# Patient Record
Sex: MALE | Race: WHITE | NOT HISPANIC OR LATINO | Employment: OTHER | URBAN - METROPOLITAN AREA
[De-identification: names, ages, dates, MRNs, and addresses within clinical notes are randomized per-mention and may not be internally consistent; named-entity substitution may affect disease eponyms.]

---

## 2022-10-14 ENCOUNTER — TELEPHONE (OUTPATIENT)
Dept: NEUROLOGY | Facility: CLINIC | Age: 72
End: 2022-10-14

## 2023-03-09 ENCOUNTER — OFFICE VISIT (OUTPATIENT)
Dept: NEUROSURGERY | Facility: CLINIC | Age: 73
End: 2023-03-09
Payer: MEDICARE

## 2023-03-09 VITALS — HEART RATE: 55 BPM | HEIGHT: 65 IN | DIASTOLIC BLOOD PRESSURE: 66 MMHG | SYSTOLIC BLOOD PRESSURE: 122 MMHG

## 2023-03-09 DIAGNOSIS — G99.2 MYELOPATHY CONCURRENT WITH AND DUE TO SPINAL STENOSIS OF CERVICAL REGION: ICD-10-CM

## 2023-03-09 DIAGNOSIS — G95.89 CERVICAL CORD MYELOMALACIA: Primary | ICD-10-CM

## 2023-03-09 DIAGNOSIS — M48.02 MYELOPATHY CONCURRENT WITH AND DUE TO SPINAL STENOSIS OF CERVICAL REGION: ICD-10-CM

## 2023-03-09 PROCEDURE — 1160F RVW MEDS BY RX/DR IN RCRD: CPT | Performed by: NEUROLOGICAL SURGERY

## 2023-03-09 PROCEDURE — 1159F MED LIST DOCD IN RCRD: CPT | Performed by: NEUROLOGICAL SURGERY

## 2023-03-09 PROCEDURE — 99204 OFFICE O/P NEW MOD 45 MIN: CPT | Performed by: NEUROLOGICAL SURGERY

## 2023-03-09 RX ORDER — IPRATROPIUM BROMIDE AND ALBUTEROL SULFATE 2.5; .5 MG/3ML; MG/3ML
SOLUTION RESPIRATORY (INHALATION)
COMMUNITY
Start: 2023-02-18

## 2023-03-09 RX ORDER — ATORVASTATIN CALCIUM 40 MG/1
TABLET, FILM COATED ORAL
COMMUNITY

## 2023-03-09 RX ORDER — CLOPIDOGREL BISULFATE 75 MG/1
TABLET ORAL
COMMUNITY
Start: 2023-02-06

## 2023-03-09 RX ORDER — GLYBURIDE 2.5 MG/1
TABLET ORAL
COMMUNITY
Start: 2023-02-06

## 2023-03-09 RX ORDER — CARVEDILOL 6.25 MG/1
TABLET ORAL
COMMUNITY
Start: 2023-02-06

## 2023-03-09 RX ORDER — OMEPRAZOLE 20 MG/1
CAPSULE, DELAYED RELEASE ORAL
COMMUNITY
Start: 2023-02-06

## 2023-03-09 RX ORDER — PYRIDOXINE HCL (VITAMIN B6) 100 MG
TABLET ORAL
COMMUNITY

## 2023-03-09 RX ORDER — FOLIC ACID 1 MG/1
TABLET ORAL
COMMUNITY

## 2023-03-09 RX ORDER — SACUBITRIL AND VALSARTAN 24; 26 MG/1; MG/1
TABLET, FILM COATED ORAL
COMMUNITY
Start: 2023-02-18

## 2023-03-09 RX ORDER — ESCITALOPRAM OXALATE 10 MG/1
TABLET ORAL
COMMUNITY
Start: 2023-02-06

## 2023-03-09 RX ORDER — ASPIRIN 81 MG/1
TABLET ORAL
COMMUNITY
Start: 2023-02-18

## 2023-03-09 NOTE — PROGRESS NOTES
"Chief Complaint  Neck Pain    Subjective          Felix Cortez who is a 72 y.o. year old male who presents to [facility] for Evaluation of the Spine.     The patient complains of pain located in the cervical spine.  Patients states the pain has been present for 1 year.  The pain came on gradually in a wheelchair for about 3 weeks now. The pain scale level is 5-10/10.  The pain radiates into the bilateral arms with numbness, swelling and discoloration.  The pain is waxing/waning and described as burning.  The pain is worse at no particular time of day. Patient states laying down and turning left makes the pain worse.  Patient states changing positions makes the pain better. For about a month or more, his legs have had \"a mind of their own\" with jerking and \"earthquaking.\"    Associated Symptoms Include: Numbness and tingling into the arms and legs, present all the time  Conservative Interventions Include: PT-some help    Was this the result of an injury or accident?: Yes, Fall in the hospital    History of Previous Spinal Surgery?: No    This patient  reports that he has been smoking cigarettes. He has never used smokeless tobacco.    Review of Systems   Musculoskeletal: Positive for arthralgias, back pain, gait problem, myalgias and neck pain.   Neurological: Positive for weakness and numbness.        Objective   Vital Signs:   /66 (BP Location: Left arm, Patient Position: Sitting)   Pulse 55   Ht 165.1 cm (65\")       Physical Exam  Constitutional:       Comments: In wheelchair   Cardiovascular:      Comments: No significant LE edema  Pulmonary:      Effort: Pulmonary effort is normal.   Neurological:      Mental Status: He is alert.      Sensory: Sensory deficit (decreased to light touch in the bilateral upper and lower extremities) present.      Motor: No weakness.      Deep Tendon Reflexes: Reflexes abnormal.      Comments: Clearly myelopathy with " hyperactive reflexes and clonus in the BLE and Hoffmans in the BUE   Psychiatric:         Mood and Affect: Mood normal.          Result Review :   I personally reviewed the patient's MRI scan which shows multilevel degenerative changes with spinal stenosis and signal change within the spinal cord at C3-4.       Assessment and Plan    Diagnoses and all orders for this visit:    1. Cervical cord myelomalacia (HCC) (Primary)    2. Myelopathy concurrent with and due to spinal stenosis of cervical region (HCC)    I feel his best chance to prevent further decline and potentially improve is decompression at C3-4.    He will need permission to hold his ASA and Plavix for a week.    He is at increased risk of complication with his medical issues.    We discussed the importance of smoking/nicotine cessation. Smoking/nicotine use has multiple health risks. In particular related to the spine, nicotine increases the incidence of lower back pain, speeds up the progression of degenerative disc disease and dramatically reduces healing after spine surgery (particularly a fusion operation).     Follow Up   No follow-ups on file.  Patient was given instructions and counseling regarding his condition or for health maintenance advice. Please see specific information pulled into the AVS if appropriate.

## 2023-03-10 ENCOUNTER — TELEPHONE (OUTPATIENT)
Dept: NEUROSURGERY | Facility: CLINIC | Age: 73
End: 2023-03-10
Payer: MEDICARE

## 2023-03-10 NOTE — TELEPHONE ENCOUNTER
Kierra with [facility] called to get copy of office note faxed and follow up on scheduling of surgery. I advised that patient needs cardiac clearance before we can schedule, and patient is unaware who is cardiologist is. Kierra states they will get patient an appointment and have them send us something for clearance. Office visit faxed.

## 2023-03-10 NOTE — TELEPHONE ENCOUNTER
EAMON FROM FACILITY CALLED TO STATE THE PATIENT HAS A CARDIOLOGY APPT ON 3/28.  EAMON WANTED TO KNOW IF SX CAN GET SCHEDULED SO THEY CAN MAKE PLANS, OR IF SCHEDULING THE SX WOULD NEED TO WAIT UNTIL AFTER CARDIOLOGY APPT.    PLEASE CALL TO ADVISE  [phone]

## 2023-03-22 PROBLEM — G99.2 MYELOPATHY CONCURRENT WITH AND DUE TO SPINAL STENOSIS OF CERVICAL REGION: Status: ACTIVE | Noted: 2023-03-22

## 2023-03-22 PROBLEM — G95.89 CERVICAL CORD MYELOMALACIA: Status: ACTIVE | Noted: 2023-03-22

## 2023-03-22 PROBLEM — M48.02 MYELOPATHY CONCURRENT WITH AND DUE TO SPINAL STENOSIS OF CERVICAL REGION: Status: ACTIVE | Noted: 2023-03-22

## 2023-03-22 NOTE — TELEPHONE ENCOUNTER
Samuel called to see we have received medical clearance. If so he would like to schedule Felix's surgery.    Felix request a call back at [phone]

## 2023-04-11 ENCOUNTER — TELEPHONE (OUTPATIENT)
Dept: NEUROSURGERY | Facility: CLINIC | Age: 73
End: 2023-04-11
Payer: MEDICARE

## 2023-04-11 NOTE — TELEPHONE ENCOUNTER
Kate with [facility] financial clearance sent email to ask product information for allograft, cage, and spinal instrumentation. I know that you use [employer], but I believe they may have requested wrong CPT codes, will you be using a cage? Here are the codes submitted: 33885, 81448, 80638, 41678, 71531.  Patient having C3-4 ACDF.   I don't think they need the 86266? And maybe the 68043 should be 32141?   Surgery 4-19-23.

## 2023-04-14 ENCOUNTER — TELEPHONE (OUTPATIENT)
Dept: NEUROLOGY | Facility: CLINIC | Age: 73
End: 2023-04-14
Payer: MEDICARE

## 2023-04-14 RX ORDER — FUROSEMIDE 20 MG/1
20 TABLET ORAL 2 TIMES DAILY
COMMUNITY

## 2023-04-14 RX ORDER — LIDOCAINE 4 G/G
PATCH TOPICAL
COMMUNITY

## 2023-04-14 RX ORDER — TRAMADOL HYDROCHLORIDE 50 MG/1
50 TABLET ORAL EVERY 8 HOURS PRN
COMMUNITY
Start: 2023-04-06 | End: 2023-04-21 | Stop reason: HOSPADM

## 2023-04-14 RX ORDER — GABAPENTIN 100 MG/1
100 CAPSULE ORAL NIGHTLY
COMMUNITY
Start: 2023-04-07

## 2023-04-14 RX ORDER — ACETAMINOPHEN 325 MG/1
650 TABLET ORAL EVERY 6 HOURS PRN
COMMUNITY

## 2023-04-14 RX ORDER — CHOLECALCIFEROL (VITAMIN D3) 125 MCG
5 CAPSULE ORAL NIGHTLY PRN
COMMUNITY

## 2023-04-14 NOTE — PRE-PROCEDURE INSTRUCTIONS
IMPORTANT INSTRUCTIONS - PRE-ADMISSION TESTING  1. DO NOT EAT OR CHEW anything after midnight the night before your procedure.    2. You may have CLEAR liquids up to ______ hours prior to ARRIVAL time.   3. Take the following medications the morning of your procedure with JUST A SIP OF WATER:  _CARVEDILOL, OMEPRAZOLE, ESCITALOPRAM. PRN: DUO NEB, TRAMADOL, TYLENOL________________________________________________________________________________________________________________________________________________________*_LAST DOSE OF CLOPIDOGREL, ASPIRIN, VOLTARIN,FOLIC ACID, MELATONIN TO BE 4/13/23_____________________________    4. DO NOT BRING your medications to the hospital with you, UNLESS something has changed since your PRE-Admission Testing appointment.  5. Hold all vitamins, supplements, and NSAIDS (Non- steroidal anti-inflammatory meds) for one week prior to surgery (you MAY take Tylenol or Acetaminophen).  6. If you are diabetic, check your blood sugar the morning of your procedure. If it is less than 70 or if you are feeling symptomatic, call the following number for further instructions: _[phone] _SAME DAY SURGERY WILL CALL ARRIVAL TIME_____.  7. Use your inhalers/nebulizers as usual, the morning of your procedure. BRING YOUR INHALERS with you.   8. Bring your CPAP or BIPAP to hospital, ONLY IF YOU WILL BE SPENDING THE NIGHT. NA  9. Make sure you have a ride home and have someone who will stay with you the day of your procedure after you go home.  10. If you have any questions, please call your Pre-Admission Testing Nurse, __EMANI CONNOR RN______________ at [phone]_.   11. Per anesthesia request, do not smoke for 24 hours before your procedure or as instructed by your surgeon. !!!!!   PREOPERATIVE (BEFORE SURGERY)              BATHING INSTRUCTIONS  Instructions:   • You will need to shower 1 time utilizing the soap provided; at the times indicated   below:     - 4/18/23  PM OR 4/19 AM    •  Wash your hair and face with normal shampoo and soap, rinse it well before using the surgical soap.     • In the shower, wet the skin completely with water from your neck to your feet. Apply the cleanser to your   body ONLY FROM THE NECK TO YOUR FEET.    • Do NOT USE THE CLEANSER ON YOUR FACE, HEAD, OR GENITAL (PRIVATE) AREAS.   Keep it out of your eyes, ears, and mouth because of the risk of injury to those areas.     • Scrub with a clean washcloth for each bath utilizing the soap provided from the top of your body to the   bottom starting at the neck area.     • Pay close attention to your armpits, groin area, and the site of surgery.     • Wash your body gently for 5 minutes. Stand outside the stream or turn off the water while scrubbing your   body. Do NOT wash with your regular soap after the surgical cleanser is used.     • RINSE THE CLEANSER OFF COMPLETELY with plenty of water. Rinse the area again thoroughly.     • Dry off with a clean towel. The surgical soap can cause dryness; however do NOT APPLY LOTION,   CREAM, POWDER, and/or DEODORANT AFTER SHOWERING.    • Be sure to where clean clothes after showering.     • Ensure CLEAN BED LINENS AFTER FIRST wash with the surgical soap.     • NO PETS ALLOWED IN THE BED with you after utilizing the surgical soap.

## 2023-04-14 NOTE — TELEPHONE ENCOUNTER
VIJAY, RECEIVED A CALL FROM PRE ADMISSION TESTING CALLING TO NOTIFY US THAT AFTER CALLING AROUND TO LOCATE PATIENT, SHE FOUND OUT PATIENT IS INPATIENT AT [facility]

## 2023-04-14 NOTE — NURSING NOTE
PT IS ON PAT CALL SCHEDULE AND FOUND TO BE IN PT. IN [facility].  FACILITY CALLED HISTORY AND PT INFORMATION REQUESTED. AWAITING FAX.

## 2023-04-14 NOTE — NURSING NOTE
PREOP INST RE: FASTING, BATHING, MED ADMINISTRATION FAXED TO [facility].. CALLED AND LM FOR PRIMARY RN ANJALI TO EXPECT FAX.

## 2023-04-17 ENCOUNTER — ANESTHESIA EVENT (OUTPATIENT)
Dept: PERIOP | Facility: HOSPITAL | Age: 73
End: 2023-04-17
Payer: MEDICARE

## 2023-04-19 ENCOUNTER — APPOINTMENT (OUTPATIENT)
Dept: GENERAL RADIOLOGY | Facility: HOSPITAL | Age: 73
End: 2023-04-19
Payer: MEDICARE

## 2023-04-19 ENCOUNTER — ANESTHESIA (OUTPATIENT)
Dept: PERIOP | Facility: HOSPITAL | Age: 73
End: 2023-04-19
Payer: MEDICARE

## 2023-04-19 ENCOUNTER — HOSPITAL ENCOUNTER (OUTPATIENT)
Facility: HOSPITAL | Age: 73
Discharge: NURSING, REHAB OR LONG-TERM CARE | End: 2023-04-21
Attending: NEUROLOGICAL SURGERY | Admitting: NEUROLOGICAL SURGERY
Payer: MEDICARE

## 2023-04-19 DIAGNOSIS — G95.89 CERVICAL CORD MYELOMALACIA: ICD-10-CM

## 2023-04-19 DIAGNOSIS — R26.2 DIFFICULTY IN WALKING: Primary | ICD-10-CM

## 2023-04-19 DIAGNOSIS — M48.02 MYELOPATHY CONCURRENT WITH AND DUE TO SPINAL STENOSIS OF CERVICAL REGION: ICD-10-CM

## 2023-04-19 DIAGNOSIS — G99.2 MYELOPATHY CONCURRENT WITH AND DUE TO SPINAL STENOSIS OF CERVICAL REGION: ICD-10-CM

## 2023-04-19 DIAGNOSIS — Z78.9 DECREASED ACTIVITIES OF DAILY LIVING (ADL): ICD-10-CM

## 2023-04-19 LAB
ANION GAP SERPL CALCULATED.3IONS-SCNC: 10.8 MMOL/L (ref 5–15)
BUN SERPL-MCNC: 16 MG/DL (ref 8–23)
BUN/CREAT SERPL: 22.9 (ref 7–25)
CALCIUM SPEC-SCNC: 9.6 MG/DL (ref 8.6–10.5)
CHLORIDE SERPL-SCNC: 108 MMOL/L (ref 98–107)
CO2 SERPL-SCNC: 27.2 MMOL/L (ref 22–29)
CREAT SERPL-MCNC: 0.7 MG/DL (ref 0.76–1.27)
EGFRCR SERPLBLD CKD-EPI 2021: 97.9 ML/MIN/1.73
GLUCOSE BLDC GLUCOMTR-MCNC: 117 MG/DL (ref 70–99)
GLUCOSE SERPL-MCNC: 110 MG/DL (ref 65–99)
POTASSIUM SERPL-SCNC: 3.6 MMOL/L (ref 3.5–5.2)
SODIUM SERPL-SCNC: 146 MMOL/L (ref 136–145)

## 2023-04-19 PROCEDURE — 63710000001 VITAMIN D3 125 MCG (5000 UT) CAPSULE: Performed by: NEUROLOGICAL SURGERY

## 2023-04-19 PROCEDURE — 25010000002 ONDANSETRON PER 1 MG: Performed by: NURSE ANESTHETIST, CERTIFIED REGISTERED

## 2023-04-19 PROCEDURE — 82962 GLUCOSE BLOOD TEST: CPT

## 2023-04-19 PROCEDURE — 25010000002 FENTANYL CITRATE (PF) 50 MCG/ML SOLUTION: Performed by: NURSE ANESTHETIST, CERTIFIED REGISTERED

## 2023-04-19 PROCEDURE — A9270 NON-COVERED ITEM OR SERVICE: HCPCS | Performed by: NEUROLOGICAL SURGERY

## 2023-04-19 PROCEDURE — 63710000001 MELATONIN 5 MG TABLET: Performed by: NEUROLOGICAL SURGERY

## 2023-04-19 PROCEDURE — 63710000001 OXYCODONE 5 MG TABLET: Performed by: NURSE ANESTHETIST, CERTIFIED REGISTERED

## 2023-04-19 PROCEDURE — C1713 ANCHOR/SCREW BN/BN,TIS/BN: HCPCS | Performed by: NEUROLOGICAL SURGERY

## 2023-04-19 PROCEDURE — 22551 ARTHRD ANT NTRBDY CERVICAL: CPT | Performed by: SPECIALIST/TECHNOLOGIST, OTHER

## 2023-04-19 PROCEDURE — 25010000002 HYDROMORPHONE 1 MG/ML SOLUTION: Performed by: NURSE ANESTHETIST, CERTIFIED REGISTERED

## 2023-04-19 PROCEDURE — S0260 H&P FOR SURGERY: HCPCS | Performed by: NEUROLOGICAL SURGERY

## 2023-04-19 PROCEDURE — 25010000002 CEFAZOLIN IN DEXTROSE 2-4 GM/100ML-% SOLUTION: Performed by: NEUROLOGICAL SURGERY

## 2023-04-19 PROCEDURE — 80048 BASIC METABOLIC PNL TOTAL CA: CPT | Performed by: NEUROLOGICAL SURGERY

## 2023-04-19 PROCEDURE — 20931 SP BONE ALGRFT STRUCT ADD-ON: CPT | Performed by: NEUROLOGICAL SURGERY

## 2023-04-19 PROCEDURE — 63710000001 ATORVASTATIN 40 MG TABLET: Performed by: NEUROLOGICAL SURGERY

## 2023-04-19 PROCEDURE — 63710000001 FOLIC ACID 1 MG TABLET: Performed by: NEUROLOGICAL SURGERY

## 2023-04-19 PROCEDURE — 63710000001 GABAPENTIN 100 MG CAPSULE: Performed by: NEUROLOGICAL SURGERY

## 2023-04-19 PROCEDURE — 22551 ARTHRD ANT NTRBDY CERVICAL: CPT | Performed by: NEUROLOGICAL SURGERY

## 2023-04-19 PROCEDURE — 22845 INSERT SPINE FIXATION DEVICE: CPT | Performed by: SPECIALIST/TECHNOLOGIST, OTHER

## 2023-04-19 PROCEDURE — 25010000002 PROPOFOL 10 MG/ML EMULSION: Performed by: NURSE ANESTHETIST, CERTIFIED REGISTERED

## 2023-04-19 PROCEDURE — A9270 NON-COVERED ITEM OR SERVICE: HCPCS | Performed by: NURSE ANESTHETIST, CERTIFIED REGISTERED

## 2023-04-19 PROCEDURE — 22845 INSERT SPINE FIXATION DEVICE: CPT | Performed by: NEUROLOGICAL SURGERY

## 2023-04-19 PROCEDURE — 63710000001 FUROSEMIDE 20 MG TABLET: Performed by: NEUROLOGICAL SURGERY

## 2023-04-19 PROCEDURE — 94799 UNLISTED PULMONARY SVC/PX: CPT

## 2023-04-19 PROCEDURE — 25010000002 DEXAMETHASONE PER 1 MG: Performed by: NURSE ANESTHETIST, CERTIFIED REGISTERED

## 2023-04-19 PROCEDURE — 63710000001 CARVEDILOL 6.25 MG TABLET: Performed by: NEUROLOGICAL SURGERY

## 2023-04-19 PROCEDURE — 76000 FLUOROSCOPY <1 HR PHYS/QHP: CPT

## 2023-04-19 PROCEDURE — 25010000002 MIDAZOLAM PER 1MG: Performed by: ANESTHESIOLOGY

## 2023-04-19 PROCEDURE — 63710000001 SACUBITRIL-VALSARTAN 24-26 MG TABLET: Performed by: NEUROLOGICAL SURGERY

## 2023-04-19 DEVICE — ALLOGRFT SPINE CERV VERTIGRAFT WEDGE FZ 7DEG PRESERV 4.75MM: Type: IMPLANTABLE DEVICE | Site: SPINE CERVICAL | Status: FUNCTIONAL

## 2023-04-19 DEVICE — SCRW SKYLINE VAR SD 14MM: Type: IMPLANTABLE DEVICE | Site: SPINE CERVICAL | Status: FUNCTIONAL

## 2023-04-19 DEVICE — PLT SKYLINE 1LEVEL 14MM: Type: IMPLANTABLE DEVICE | Site: SPINE CERVICAL | Status: FUNCTIONAL

## 2023-04-19 RX ORDER — PROPOFOL 10 MG/ML
VIAL (ML) INTRAVENOUS AS NEEDED
Status: DISCONTINUED | OUTPATIENT
Start: 2023-04-19 | End: 2023-04-19 | Stop reason: SURG

## 2023-04-19 RX ORDER — MIDAZOLAM HYDROCHLORIDE 2 MG/2ML
1 INJECTION, SOLUTION INTRAMUSCULAR; INTRAVENOUS ONCE
Status: COMPLETED | OUTPATIENT
Start: 2023-04-19 | End: 2023-04-19

## 2023-04-19 RX ORDER — ACETAMINOPHEN 500 MG
1000 TABLET ORAL ONCE
Status: COMPLETED | OUTPATIENT
Start: 2023-04-19 | End: 2023-04-19

## 2023-04-19 RX ORDER — LIDOCAINE HYDROCHLORIDE 20 MG/ML
INJECTION, SOLUTION EPIDURAL; INFILTRATION; INTRACAUDAL; PERINEURAL AS NEEDED
Status: DISCONTINUED | OUTPATIENT
Start: 2023-04-19 | End: 2023-04-19 | Stop reason: SURG

## 2023-04-19 RX ORDER — ONDANSETRON 2 MG/ML
INJECTION INTRAMUSCULAR; INTRAVENOUS AS NEEDED
Status: DISCONTINUED | OUTPATIENT
Start: 2023-04-19 | End: 2023-04-19 | Stop reason: SURG

## 2023-04-19 RX ORDER — ONDANSETRON 2 MG/ML
4 INJECTION INTRAMUSCULAR; INTRAVENOUS ONCE AS NEEDED
Status: DISCONTINUED | OUTPATIENT
Start: 2023-04-19 | End: 2023-04-19 | Stop reason: HOSPADM

## 2023-04-19 RX ORDER — OXYCODONE AND ACETAMINOPHEN 10; 325 MG/1; MG/1
1 TABLET ORAL EVERY 4 HOURS PRN
Status: DISCONTINUED | OUTPATIENT
Start: 2023-04-19 | End: 2023-04-21 | Stop reason: HOSPADM

## 2023-04-19 RX ORDER — DEXAMETHASONE SODIUM PHOSPHATE 4 MG/ML
INJECTION, SOLUTION INTRA-ARTICULAR; INTRALESIONAL; INTRAMUSCULAR; INTRAVENOUS; SOFT TISSUE AS NEEDED
Status: DISCONTINUED | OUTPATIENT
Start: 2023-04-19 | End: 2023-04-19 | Stop reason: SURG

## 2023-04-19 RX ORDER — MORPHINE SULFATE 2 MG/ML
2 INJECTION, SOLUTION INTRAMUSCULAR; INTRAVENOUS EVERY 4 HOURS PRN
Status: DISCONTINUED | OUTPATIENT
Start: 2023-04-19 | End: 2023-04-21 | Stop reason: HOSPADM

## 2023-04-19 RX ORDER — MAGNESIUM HYDROXIDE 1200 MG/15ML
LIQUID ORAL AS NEEDED
Status: DISCONTINUED | OUTPATIENT
Start: 2023-04-19 | End: 2023-04-19 | Stop reason: HOSPADM

## 2023-04-19 RX ORDER — PROMETHAZINE HYDROCHLORIDE 12.5 MG/1
25 TABLET ORAL ONCE AS NEEDED
Status: DISCONTINUED | OUTPATIENT
Start: 2023-04-19 | End: 2023-04-19 | Stop reason: HOSPADM

## 2023-04-19 RX ORDER — SODIUM CHLORIDE, SODIUM LACTATE, POTASSIUM CHLORIDE, CALCIUM CHLORIDE 600; 310; 30; 20 MG/100ML; MG/100ML; MG/100ML; MG/100ML
9 INJECTION, SOLUTION INTRAVENOUS CONTINUOUS PRN
Status: DISCONTINUED | OUTPATIENT
Start: 2023-04-19 | End: 2023-04-19 | Stop reason: HOSPADM

## 2023-04-19 RX ORDER — CEFAZOLIN SODIUM 2 G/100ML
2 INJECTION, SOLUTION INTRAVENOUS ONCE
Status: COMPLETED | OUTPATIENT
Start: 2023-04-19 | End: 2023-04-19

## 2023-04-19 RX ORDER — ONDANSETRON 2 MG/ML
4 INJECTION INTRAMUSCULAR; INTRAVENOUS EVERY 6 HOURS PRN
Status: DISCONTINUED | OUTPATIENT
Start: 2023-04-19 | End: 2023-04-21 | Stop reason: HOSPADM

## 2023-04-19 RX ORDER — DEXMEDETOMIDINE HYDROCHLORIDE 100 UG/ML
INJECTION, SOLUTION INTRAVENOUS AS NEEDED
Status: DISCONTINUED | OUTPATIENT
Start: 2023-04-19 | End: 2023-04-19 | Stop reason: SURG

## 2023-04-19 RX ORDER — GABAPENTIN 100 MG/1
100 CAPSULE ORAL NIGHTLY
Status: DISCONTINUED | OUTPATIENT
Start: 2023-04-19 | End: 2023-04-21 | Stop reason: HOSPADM

## 2023-04-19 RX ORDER — FENTANYL CITRATE 50 UG/ML
INJECTION, SOLUTION INTRAMUSCULAR; INTRAVENOUS AS NEEDED
Status: DISCONTINUED | OUTPATIENT
Start: 2023-04-19 | End: 2023-04-19 | Stop reason: SURG

## 2023-04-19 RX ORDER — GLIPIZIDE 5 MG/1
5 TABLET ORAL
Status: DISCONTINUED | OUTPATIENT
Start: 2023-04-20 | End: 2023-04-21 | Stop reason: HOSPADM

## 2023-04-19 RX ORDER — ESCITALOPRAM OXALATE 10 MG/1
10 TABLET ORAL EVERY MORNING
Status: DISCONTINUED | OUTPATIENT
Start: 2023-04-20 | End: 2023-04-21 | Stop reason: HOSPADM

## 2023-04-19 RX ORDER — CHOLECALCIFEROL (VITAMIN D3) 125 MCG
5 CAPSULE ORAL NIGHTLY
Status: DISCONTINUED | OUTPATIENT
Start: 2023-04-19 | End: 2023-04-21 | Stop reason: HOSPADM

## 2023-04-19 RX ORDER — PANTOPRAZOLE SODIUM 40 MG/1
40 TABLET, DELAYED RELEASE ORAL
Status: DISCONTINUED | OUTPATIENT
Start: 2023-04-20 | End: 2023-04-21 | Stop reason: HOSPADM

## 2023-04-19 RX ORDER — SODIUM CHLORIDE 9 MG/ML
50 INJECTION, SOLUTION INTRAVENOUS CONTINUOUS
Status: DISCONTINUED | OUTPATIENT
Start: 2023-04-19 | End: 2023-04-21 | Stop reason: HOSPADM

## 2023-04-19 RX ORDER — KETAMINE HCL IN NACL, ISO-OSM 100MG/10ML
SYRINGE (ML) INJECTION AS NEEDED
Status: DISCONTINUED | OUTPATIENT
Start: 2023-04-19 | End: 2023-04-19 | Stop reason: SURG

## 2023-04-19 RX ORDER — ROCURONIUM BROMIDE 10 MG/ML
INJECTION, SOLUTION INTRAVENOUS AS NEEDED
Status: DISCONTINUED | OUTPATIENT
Start: 2023-04-19 | End: 2023-04-19 | Stop reason: SURG

## 2023-04-19 RX ORDER — ATORVASTATIN CALCIUM 40 MG/1
40 TABLET, FILM COATED ORAL NIGHTLY
Status: DISCONTINUED | OUTPATIENT
Start: 2023-04-19 | End: 2023-04-21 | Stop reason: HOSPADM

## 2023-04-19 RX ORDER — PROMETHAZINE HYDROCHLORIDE 25 MG/1
25 SUPPOSITORY RECTAL ONCE AS NEEDED
Status: DISCONTINUED | OUTPATIENT
Start: 2023-04-19 | End: 2023-04-19 | Stop reason: HOSPADM

## 2023-04-19 RX ORDER — OXYCODONE HYDROCHLORIDE 5 MG/1
5 TABLET ORAL
Status: DISCONTINUED | OUTPATIENT
Start: 2023-04-19 | End: 2023-04-19 | Stop reason: HOSPADM

## 2023-04-19 RX ORDER — CEFAZOLIN SODIUM 2 G/100ML
2 INJECTION, SOLUTION INTRAVENOUS EVERY 8 HOURS
Status: COMPLETED | OUTPATIENT
Start: 2023-04-19 | End: 2023-04-20

## 2023-04-19 RX ORDER — IPRATROPIUM BROMIDE AND ALBUTEROL SULFATE 2.5; .5 MG/3ML; MG/3ML
3 SOLUTION RESPIRATORY (INHALATION) EVERY 4 HOURS PRN
Status: DISCONTINUED | OUTPATIENT
Start: 2023-04-19 | End: 2023-04-21 | Stop reason: HOSPADM

## 2023-04-19 RX ORDER — FUROSEMIDE 20 MG/1
20 TABLET ORAL 2 TIMES DAILY
Status: DISCONTINUED | OUTPATIENT
Start: 2023-04-19 | End: 2023-04-21 | Stop reason: HOSPADM

## 2023-04-19 RX ORDER — FOLIC ACID 1 MG/1
1 TABLET ORAL DAILY
Status: DISCONTINUED | OUTPATIENT
Start: 2023-04-19 | End: 2023-04-21 | Stop reason: HOSPADM

## 2023-04-19 RX ORDER — NALOXONE HCL 0.4 MG/ML
0.4 VIAL (ML) INJECTION
Status: DISCONTINUED | OUTPATIENT
Start: 2023-04-19 | End: 2023-04-21 | Stop reason: HOSPADM

## 2023-04-19 RX ORDER — EPHEDRINE SULFATE 50 MG/ML
INJECTION, SOLUTION INTRAVENOUS AS NEEDED
Status: DISCONTINUED | OUTPATIENT
Start: 2023-04-19 | End: 2023-04-19 | Stop reason: SURG

## 2023-04-19 RX ORDER — BUPIVACAINE HYDROCHLORIDE AND EPINEPHRINE 5; 5 MG/ML; UG/ML
INJECTION, SOLUTION EPIDURAL; INTRACAUDAL; PERINEURAL AS NEEDED
Status: DISCONTINUED | OUTPATIENT
Start: 2023-04-19 | End: 2023-04-19 | Stop reason: HOSPADM

## 2023-04-19 RX ORDER — OXYCODONE HYDROCHLORIDE AND ACETAMINOPHEN 5; 325 MG/1; MG/1
1 TABLET ORAL EVERY 4 HOURS PRN
Status: DISCONTINUED | OUTPATIENT
Start: 2023-04-19 | End: 2023-04-21 | Stop reason: HOSPADM

## 2023-04-19 RX ORDER — CARVEDILOL 6.25 MG/1
6.25 TABLET ORAL 2 TIMES DAILY WITH MEALS
Status: DISCONTINUED | OUTPATIENT
Start: 2023-04-19 | End: 2023-04-21 | Stop reason: HOSPADM

## 2023-04-19 RX ORDER — CARVEDILOL 6.25 MG/1
6.25 TABLET ORAL ONCE
Status: COMPLETED | OUTPATIENT
Start: 2023-04-19 | End: 2023-04-19

## 2023-04-19 RX ADMIN — LIDOCAINE HYDROCHLORIDE 100 MG: 20 INJECTION, SOLUTION EPIDURAL; INFILTRATION; INTRACAUDAL; PERINEURAL at 11:22

## 2023-04-19 RX ADMIN — CARVEDILOL 6.25 MG: 6.25 TABLET, FILM COATED ORAL at 17:14

## 2023-04-19 RX ADMIN — CEFAZOLIN SODIUM 2 G: 2 INJECTION, SOLUTION INTRAVENOUS at 18:36

## 2023-04-19 RX ADMIN — Medication 20 MG: at 11:50

## 2023-04-19 RX ADMIN — DEXMEDETOMIDINE HYDROCHLORIDE 10 MCG: 100 INJECTION, SOLUTION, CONCENTRATE INTRAVENOUS at 11:55

## 2023-04-19 RX ADMIN — SODIUM CHLORIDE 50 ML/HR: 9 INJECTION, SOLUTION INTRAVENOUS at 14:37

## 2023-04-19 RX ADMIN — Medication 5 MG: at 20:37

## 2023-04-19 RX ADMIN — CEFAZOLIN SODIUM 2 G: 2 INJECTION, SOLUTION INTRAVENOUS at 11:16

## 2023-04-19 RX ADMIN — FUROSEMIDE 20 MG: 20 TABLET ORAL at 20:38

## 2023-04-19 RX ADMIN — EPHEDRINE SULFATE 10 MG: 50 INJECTION INTRAVENOUS at 11:42

## 2023-04-19 RX ADMIN — MIDAZOLAM HYDROCHLORIDE 1 MG: 1 INJECTION, SOLUTION INTRAMUSCULAR; INTRAVENOUS at 11:02

## 2023-04-19 RX ADMIN — Medication 5000 UNITS: at 17:14

## 2023-04-19 RX ADMIN — OXYCODONE 5 MG: 5 TABLET ORAL at 13:12

## 2023-04-19 RX ADMIN — ROCURONIUM BROMIDE 50 MG: 10 INJECTION, SOLUTION INTRAVENOUS at 11:22

## 2023-04-19 RX ADMIN — HYDROMORPHONE HYDROCHLORIDE 0.5 MG: 1 INJECTION, SOLUTION INTRAMUSCULAR; INTRAVENOUS; SUBCUTANEOUS at 13:05

## 2023-04-19 RX ADMIN — FENTANYL CITRATE 50 MCG: 50 INJECTION, SOLUTION INTRAMUSCULAR; INTRAVENOUS at 11:53

## 2023-04-19 RX ADMIN — ROCURONIUM BROMIDE 20 MG: 10 INJECTION, SOLUTION INTRAVENOUS at 11:53

## 2023-04-19 RX ADMIN — SACUBITRIL AND VALSARTAN 1 TABLET: 24; 26 TABLET, FILM COATED ORAL at 20:37

## 2023-04-19 RX ADMIN — ONDANSETRON 4 MG: 2 INJECTION INTRAMUSCULAR; INTRAVENOUS at 11:34

## 2023-04-19 RX ADMIN — Medication 10 MG: at 11:21

## 2023-04-19 RX ADMIN — GABAPENTIN 100 MG: 100 CAPSULE ORAL at 20:38

## 2023-04-19 RX ADMIN — FENTANYL CITRATE 50 MCG: 50 INJECTION, SOLUTION INTRAMUSCULAR; INTRAVENOUS at 11:19

## 2023-04-19 RX ADMIN — Medication 1 MG: at 17:14

## 2023-04-19 RX ADMIN — CARVEDILOL 6.25 MG: 6.25 TABLET, FILM COATED ORAL at 08:38

## 2023-04-19 RX ADMIN — ACETAMINOPHEN 1000 MG: 500 TABLET ORAL at 08:38

## 2023-04-19 RX ADMIN — DEXAMETHASONE SODIUM PHOSPHATE 8 MG: 4 INJECTION, SOLUTION INTRA-ARTICULAR; INTRALESIONAL; INTRAMUSCULAR; INTRAVENOUS; SOFT TISSUE at 11:34

## 2023-04-19 RX ADMIN — PROPOFOL 80 MG: 10 INJECTION, EMULSION INTRAVENOUS at 11:22

## 2023-04-19 RX ADMIN — SUGAMMADEX 200 MG: 100 INJECTION, SOLUTION INTRAVENOUS at 12:46

## 2023-04-19 RX ADMIN — ATORVASTATIN CALCIUM 40 MG: 40 TABLET, FILM COATED ORAL at 20:38

## 2023-04-19 RX ADMIN — EPHEDRINE SULFATE 10 MG: 50 INJECTION INTRAVENOUS at 11:38

## 2023-04-19 RX ADMIN — HYDROMORPHONE HYDROCHLORIDE 0.5 MG: 1 INJECTION, SOLUTION INTRAMUSCULAR; INTRAVENOUS; SUBCUTANEOUS at 13:12

## 2023-04-19 RX ADMIN — SODIUM CHLORIDE, POTASSIUM CHLORIDE, SODIUM LACTATE AND CALCIUM CHLORIDE 9 ML/HR: 600; 310; 30; 20 INJECTION, SOLUTION INTRAVENOUS at 11:02

## 2023-04-19 NOTE — OP NOTE
CERVICAL DISCECTOMY ANTERIOR WITH FUSION  Procedure Report    Patient Name:  Felix Cortez  YOB: 1950    Date of Surgery:  4/19/2023     Indications: Cervical spinal stenosis with myelomalacia.    Pre-op Diagnosis:   Cervical cord myelomalacia (HCC) [G95.89]  Myelopathy concurrent with and due to spinal stenosis of cervical region (HCC) [M48.02, G99.2]       Post-Op Diagnosis Codes:     * Cervical cord myelomalacia [G95.89]     * Myelopathy concurrent with and due to spinal stenosis of cervical region [M48.02, G99.2]    Procedure/CPT® Codes:      Procedure(s):  ANTERIOR CERVICAL DISCECTOMY AND FUSION USING ALLOGRAFT BONE AND INSTRUMENTATION, right approach, cervical 3-cervical 4    Staff:  Surgeon(s):  Brett Patterson MD    Assistant: Margaret Kramer RN CSA    Anesthesia: General    Estimated Blood Loss: 30 mL      Specimen:          None        Findings: Central disc osteophyte with spinal stenosis    Complications: No apparent intraoperative complications    Description of Procedure:   After informed consent was obtained, the patient was brought to the operating room.  After the induction of adequate general endotracheal anesthesia, the patient was placed in the supine position.  A small bump was placed under the neck and the head was placed on a donut head wong.  The neck was prepped and draped in typical fashion.  A timeout was performed.  The surgical level was localized using the C arm.  A transverse skin crease was divided and taken down through the platysma.  The platysma was undermined superiorly and inferiorly.  An avascular plane was then created medial to the carotid artery.  Dissection was continued down to the anterior cervical spine.  The anterior cervical spine was cleared of soft tissue using a Kittner sponge.    The C3-4 level was localized using a spinal needle and the C arm.  There were significant anterior osteophytes.  The longus coli was dissected laterally to allow  for placement of the shadow line retractor.  A distraction pin was then placed at C3 and again confirmed with fluoroscopy subsequently a distraction pin was placed at C4.  The disc base was distracted across.  The disc space was incised using 11 blade and the disc material moved using a Suraj pituitary.  The anterior osteophyte overhanging from C3 was undercut using a 2 mm Kerrison punch.  The curette was used to remove the disc down to the posterior osteophyte.  The C4 osteophyte was undercut using a 1 mm Kerrison punch allowing for the C3 osteophyte that and be undercut using the 1 mm Kerrison.  The osteophyte was removed from uncovertebral to uncovertebral joint.  The posterior longitudinal ligament was then elevated using the micro nerve hook and undercut using the 1 mm Kerrison punch decompressing again from uncovertebral to uncovertebral joint.  The spinal cord appeared nicely decompressed at this point.  Hemostasis was obtained using combination of bipolar electrocautery as well as Gelfoam with thrombin.  The wound was irrigated.  The disc base defect was sized and a 4 x 6 VG-2 bone graft was seen to the appropriate fit.  This was obtained, rinsed and reconstituted using normal saline.  The rasp was used to further prepare the endplates and the bone graft was then tamped into the disc space slightly countersinking.  The distraction pins were then removed and the anterior osteophytes were removed using a small rongeur.  A size 14 skyline plate was then secured to the C3 and C4 vertebral bodies using 14 mm bone screws.  After securing the screws firmly to the plate, the cam screws were tightened.    The Shadow-line retractor was removed and hemostasis was assured in the soft tissue.  The wound was irrigated with normal saline.  The platysma was reapproximated using a running 2-0 Vicryl followed by a subcutaneous 4-0 Monocryl to reapproximate the skin edges.  The wound was dressed with Mastisol, Steri-Strips,  Telfa and Tegaderm.  There were no apparent intraoperative complications.  All sponge and needle counts were correct.  The patient received a dose of preoperative antibiotics.    Spinal Surgery Levels Completed:1 Level      Assistant: Margaret Kramer RN CSA  was responsible for performing the following activities: Retraction, Suction, Irrigation and Placing Dressing and their skilled assistance was necessary for the success of this case.    Brett Patterson MD     Date: 4/19/2023  Time: 12:50 EDT

## 2023-04-19 NOTE — H&P
[facility]   HISTORY AND PHYSICAL    Patient Name: Felix Cortez  : 1950  MRN: [id number]  Primary Care Physician:  Dalia Mills MD  Date of admission: 2023    Subjective   Subjective     Chief Complaint: Walking difficulty    History of Present Illness  72-year-old male with a progressive history of difficulty walking.  He is now basically confined to a wheelchair because his legs are too weak for him to ambulate.  He is has a significant history of coronary artery disease.  He has significant stenosis at C3-4 with spinal cord contusion at this level.  As we discussed his best chance to regain some of his function and strength would be to undergo an ACDF to decompress the spinal cord at this level.  He understands that he is at increased risk secondary to his medical history but desires to proceed.      Review of Systems   Musculoskeletal: Positive for neck pain.   Neurological: Positive for weakness and numbness.        Personal History     Past Medical History:   Diagnosis Date   • Coronary artery disease     BYPASS ? 3 V/FOLLOWS A. LEELA MADISON   • DDD (degenerative disc disease), cervical    • Deep vein thrombosis    • Diabetes mellitus     ORAL MED   • Hyperlipidemia    • Hypertension    • Smoker        Past Surgical History:   Procedure Laterality Date   • CORONARY ARTERY BYPASS GRAFT         Family History: Family history is unknown by patient. Otherwise pertinent FHx was reviewed and not pertinent to current issue.    Social History:  reports that he has been smoking cigarettes. He has never used smokeless tobacco. He reports that he does not currently use alcohol.    Home Medications:  Diclofenac Sodium, Lidocaine, acetaminophen, aspirin, atorvastatin, carvedilol, clopidogrel, escitalopram, folic acid, furosemide, gabapentin, glyburide, ipratropium-albuterol, melatonin, omeprazole, sacubitril-valsartan, traMADol, and vitamin D3    Allergies:  No Known  Allergies    Objective    Objective     Vitals:   Temp:  [99.8 °F (37.7 °C)] 99.8 °F (37.7 °C)  Heart Rate:  [59] 59  Resp:  [20] 20  BP: (165)/(72) 165/72    Physical Exam  Constitutional:       Appearance: He is normal weight.   Cardiovascular:      Comments: No notable peripheral edema at the present  Pulmonary:      Effort: Pulmonary effort is normal.   Skin:     General: Skin is warm and dry.   Neurological:      Mental Status: He is alert.      Motor: Weakness present.      Deep Tendon Reflexes: Abnormal reflex:  Positive Sammi's and clonus.   Psychiatric:         Mood and Affect: Mood normal.         Assessment & Plan   Assessment / Plan     Brief Patient Summary:  Felix Cortez is a 72 y.o. male who has C3-4 spinal stenosis with myelomalacia and gait difficulty.    Active Hospital Problems:  Active Hospital Problems    Diagnosis    • Cervical cord myelomalacia    • Myelopathy concurrent with and due to spinal stenosis of cervical region      Plan:   OR today for right approach cervical 3-cervical 4 anterior cervical discectomy and fusion using allograft bone and instrumentation.  Risks and benefits discussed in detail with patient.  We discussed his slightly increased risk of hematoma with his Plavix use.  He understands and desires to proceed.  Despite his increased risk of surgery I feel this is his best option if he is ever to have a chance to walk again.    DVT prophylaxis:  Mechanical DVT prophylaxis orders are present.    CODE STATUS:       Admission Status:  I believe this patient meets outpatient status.    Brett Patterson MD

## 2023-04-19 NOTE — ANESTHESIA PREPROCEDURE EVALUATION
Anesthesia Evaluation     Patient summary reviewed and Nursing notes reviewed   no history of anesthetic complications:  NPO Solid Status: > 8 hours  NPO Liquid Status: > 2 hours           Airway   Mallampati: II  TM distance: >3 FB  Neck ROM: full  No difficulty expected  Dental    (+) edentulous    Pulmonary - normal exam    breath sounds clear to auscultation  (+) a smoker Current,   Cardiovascular - normal exam  Exercise tolerance: poor (<4 METS)    Beta blocker given within 24 hours of surgery  Rhythm: regular  Rate: normal    (+) hypertension, CAD, CABG, DVT, hyperlipidemia,       Neuro/Psych  (+) weakness,    GI/Hepatic/Renal/Endo    (+)   diabetes mellitus,     Musculoskeletal     (+) neck pain,   Abdominal    Substance History      OB/GYN          Other - negative ROS       ROS/Med Hx Other: <4METS, DECREASED MOBILITY, DDD/SPINAL STENOSIS, CURRENTLY IN REHAB.   HX CAD, HTN, CVT, DM, ETOH.   ECHO 2/12/23 EF 26%,   STRESS 2/13/23 EF 50%, MOD/LG INF WALL INFARCT W/O SIGNIFICANT ISCHEMIA.   CARDS NOTE/CLEARANCE 3/20/23. KT                   Anesthesia Plan    ASA 4     general       Anesthetic plan, risks, benefits, and alternatives have been provided, discussed and informed consent has been obtained with: patient.        CODE STATUS:

## 2023-04-19 NOTE — ANESTHESIA POSTPROCEDURE EVALUATION
Patient: Felix Cortez    Procedure Summary     Date: 04/19/23 Room / Location: [facility] OR 05 / [facility] MAIN OR    Anesthesia Start: 1114 Anesthesia Stop: 1257    Procedure: ANTERIOR CERVICAL DISCECTOMY AND FUSION USING ALLOGRAFT BONE AND INSTRUMENTATION, right approach, cervical 3-cervical 4 (Right: Spine Cervical) Diagnosis:       Cervical cord myelomalacia      Myelopathy concurrent with and due to spinal stenosis of cervical region      (Cervical cord myelomalacia (HCC) [G95.89])      (Myelopathy concurrent with and due to spinal stenosis of cervical region (HCC) [M48.02, G99.2])    Surgeons: Brett Patterson MD Provider: Erik Villareal MD    Anesthesia Type: general ASA Status: 4          Anesthesia Type: general    Vitals  Vitals Value Taken Time   /72 04/19/23 1316   Temp 36 °C (96.8 °F) 04/19/23 1256   Pulse 55 04/19/23 1317   Resp 9 04/19/23 1305   SpO2 93 % 04/19/23 1317   Vitals shown include unvalidated device data.        Post Anesthesia Care and Evaluation    Patient location during evaluation: bedside  Patient participation: complete - patient participated  Level of consciousness: awake  Pain management: adequate    Airway patency: patent  PONV Status: none  Cardiovascular status: acceptable and stable  Respiratory status: acceptable  Hydration status: acceptable    Comments: An Anesthesiologist personally participated in the most demanding procedures (including induction and emergence if applicable) in the anesthesia plan, monitored the course of anesthesia administration at frequent intervals and remained physically present and available for immediate diagnosis and treatment of emergencies.

## 2023-04-20 PROCEDURE — 97161 PT EVAL LOW COMPLEX 20 MIN: CPT

## 2023-04-20 PROCEDURE — A9270 NON-COVERED ITEM OR SERVICE: HCPCS | Performed by: NEUROLOGICAL SURGERY

## 2023-04-20 PROCEDURE — 63710000001 FUROSEMIDE 20 MG TABLET: Performed by: NEUROLOGICAL SURGERY

## 2023-04-20 PROCEDURE — 25010000002 CEFAZOLIN IN DEXTROSE 2-4 GM/100ML-% SOLUTION: Performed by: NEUROLOGICAL SURGERY

## 2023-04-20 PROCEDURE — 63710000001 MELATONIN 5 MG TABLET: Performed by: NEUROLOGICAL SURGERY

## 2023-04-20 PROCEDURE — 63710000001 GABAPENTIN 100 MG CAPSULE: Performed by: NEUROLOGICAL SURGERY

## 2023-04-20 PROCEDURE — 63710000001 FOLIC ACID 1 MG TABLET: Performed by: NEUROLOGICAL SURGERY

## 2023-04-20 PROCEDURE — 97165 OT EVAL LOW COMPLEX 30 MIN: CPT

## 2023-04-20 PROCEDURE — 63710000001 VITAMIN D3 125 MCG (5000 UT) CAPSULE: Performed by: NEUROLOGICAL SURGERY

## 2023-04-20 PROCEDURE — 63710000001 OXYCODONE-ACETAMINOPHEN 5-325 MG TABLET: Performed by: NEUROLOGICAL SURGERY

## 2023-04-20 PROCEDURE — 63710000001 GLIPIZIDE 5 MG TABLET: Performed by: NEUROLOGICAL SURGERY

## 2023-04-20 PROCEDURE — 63710000001 PANTOPRAZOLE 40 MG TABLET DELAYED-RELEASE: Performed by: NEUROLOGICAL SURGERY

## 2023-04-20 PROCEDURE — 94799 UNLISTED PULMONARY SVC/PX: CPT

## 2023-04-20 PROCEDURE — 63710000001 CARVEDILOL 6.25 MG TABLET: Performed by: NEUROLOGICAL SURGERY

## 2023-04-20 PROCEDURE — 99024 POSTOP FOLLOW-UP VISIT: CPT | Performed by: NEUROLOGICAL SURGERY

## 2023-04-20 PROCEDURE — 63710000001 ATORVASTATIN 40 MG TABLET: Performed by: NEUROLOGICAL SURGERY

## 2023-04-20 PROCEDURE — 63710000001 SACUBITRIL-VALSARTAN 24-26 MG TABLET: Performed by: NEUROLOGICAL SURGERY

## 2023-04-20 PROCEDURE — 63710000001 ESCITALOPRAM 10 MG TABLET: Performed by: NEUROLOGICAL SURGERY

## 2023-04-20 RX ADMIN — ATORVASTATIN CALCIUM 40 MG: 40 TABLET, FILM COATED ORAL at 21:18

## 2023-04-20 RX ADMIN — Medication 5 MG: at 21:18

## 2023-04-20 RX ADMIN — GABAPENTIN 100 MG: 100 CAPSULE ORAL at 21:19

## 2023-04-20 RX ADMIN — OXYCODONE AND ACETAMINOPHEN 1 TABLET: 5; 325 TABLET ORAL at 07:58

## 2023-04-20 RX ADMIN — Medication 1 MG: at 07:58

## 2023-04-20 RX ADMIN — FUROSEMIDE 20 MG: 20 TABLET ORAL at 07:58

## 2023-04-20 RX ADMIN — Medication 5000 UNITS: at 07:59

## 2023-04-20 RX ADMIN — SACUBITRIL AND VALSARTAN 1 TABLET: 24; 26 TABLET, FILM COATED ORAL at 07:59

## 2023-04-20 RX ADMIN — OXYCODONE AND ACETAMINOPHEN 1 TABLET: 5; 325 TABLET ORAL at 21:19

## 2023-04-20 RX ADMIN — PANTOPRAZOLE SODIUM 40 MG: 40 TABLET, DELAYED RELEASE ORAL at 06:25

## 2023-04-20 RX ADMIN — OXYCODONE AND ACETAMINOPHEN 1 TABLET: 5; 325 TABLET ORAL at 16:27

## 2023-04-20 RX ADMIN — ESCITALOPRAM 10 MG: 10 TABLET, FILM COATED ORAL at 06:25

## 2023-04-20 RX ADMIN — CEFAZOLIN SODIUM 2 G: 2 INJECTION, SOLUTION INTRAVENOUS at 03:36

## 2023-04-20 RX ADMIN — GLIPIZIDE 5 MG: 5 TABLET ORAL at 07:59

## 2023-04-20 RX ADMIN — CARVEDILOL 6.25 MG: 6.25 TABLET, FILM COATED ORAL at 17:23

## 2023-04-20 RX ADMIN — CARVEDILOL 6.25 MG: 6.25 TABLET, FILM COATED ORAL at 07:58

## 2023-04-20 RX ADMIN — SACUBITRIL AND VALSARTAN 1 TABLET: 24; 26 TABLET, FILM COATED ORAL at 21:18

## 2023-04-20 RX ADMIN — FUROSEMIDE 20 MG: 20 TABLET ORAL at 21:18

## 2023-04-20 NOTE — PLAN OF CARE
Goal Outcome Evaluation:  Plan of Care Reviewed With: patient        Progress: improving  Outcome Evaluation: VSS.  drsg to rt neck dry/intact.  hand grasps equal.  states rt arm and right leg still with slight numbness and feels 'heavy', but that it is much better than prior to surgery.  external catheter still in place d/t urinary incontinence.

## 2023-04-20 NOTE — PROGRESS NOTES
[facility]   Neurosurgery Progress Note    Patient Name: Felix Cortez  : 1950  MRN: [id number]  Date of admission: 2023  Surgical Procedures Since Admission:  Procedure(s):  ANTERIOR CERVICAL DISCECTOMY AND FUSION USING ALLOGRAFT BONE AND INSTRUMENTATION, right approach, cervical 3-cervical 4  Surgeon:  Brett Patterson MD  Status:  1 Day Post-Op  -------------------    Subjective   Subjective     Chief Complaint: Postop day 1 from ACDF.    History of Present Illness   He has expected neck pain.  He reports his  strength has improved.  He has not yet been out of bed.  Prior to surgery he was confined to a wheelchair.  He is hoping to go back to his nursing facility and resume some physical therapy.      Objective   Objective     Vitals:   Temp:  [96.8 °F (36 °C)-99.8 °F (37.7 °C)] 97.9 °F (36.6 °C)  Heart Rate:  [52-71] 52  Resp:  [9-20] 16  BP: (127-168)/(50-80) 135/52  Flow (L/min):  [1-4] 2  Output by Drain (mL) 23 0701 - 23 1900 23 1901 - 23 0700 23 0701 - 23 0724 Range Total   External Urinary Catheter  900  900       Dressing is clean/dry/intact.  Breathing is nonlabored.  His  strength is improved.       Assessment & Plan   Assessment / Plan     Brief Patient Summary:  Felix Cortez is a 72 y.o. male who is postop day 1 from ACDF for cervical spinal stenosis with myelomalacia.  Improve  strength.    Active Hospital Problems:  Active Hospital Problems    Diagnosis    • **Cervical cord myelomalacia    • Myelopathy concurrent with and due to spinal stenosis of cervical region      Plan:   We will work to get back to his nursing facility once his bed is available and hopefully resume physical therapy at that point.

## 2023-04-20 NOTE — PLAN OF CARE
Goal Outcome Evaluation:  Plan of Care Reviewed With: patient        Progress: no change  Outcome Evaluation: Patient presents with limitations in self-care, functional transfers, balance, and endurance. He would benefit from continued skilled occupational therapy services to maximize independence with ADLs/functional transfers. Inpatient rehab facility recommended upon discharge from hospital.

## 2023-04-20 NOTE — PLAN OF CARE
Goal Outcome Evaluation:  Plan of Care Reviewed With: patient        Progress: improving       VSS, room air. Pain controlled with PRN oral pain medication per MAR. Dressing clean dry and intact to neck incision. No significant changes this shift. Will CTM and provide care.

## 2023-04-20 NOTE — THERAPY EVALUATION
"Patient Name: Felix Cortez  : 1950    MRN: [id number]                              Today's Date: 2023       Admit Date: 2023    Visit Dx:     ICD-10-CM ICD-9-CM   1. Difficulty in walking  R26.2 719.7   2. Cervical cord myelomalacia  G95.89 336.8   3. Myelopathy concurrent with and due to spinal stenosis of cervical region  M48.02 723.0    G99.2 336.3   4. Decreased activities of daily living (ADL)  Z78.9 V49.89     Patient Active Problem List   Diagnosis   • Cervical cord myelomalacia   • Myelopathy concurrent with and due to spinal stenosis of cervical region     Past Medical History:   Diagnosis Date   • Coronary artery disease     BYPASS ? 3 V/FOLLOWS MALIK MADISON   • DDD (degenerative disc disease), cervical    • Deep vein thrombosis    • Diabetes mellitus     ORAL MED   • Hyperlipidemia    • Hypertension    • Smoker      Past Surgical History:   Procedure Laterality Date   • ANTERIOR CERVICAL DISCECTOMY W/ FUSION Right 2023    Procedure: ANTERIOR CERVICAL DISCECTOMY AND FUSION USING ALLOGRAFT BONE AND INSTRUMENTATION, right approach, cervical 3-cervical 4;  Surgeon: Brett Patterson MD;  Location: [facility];  Service: Neurosurgery;  Laterality: Right;   • CORONARY ARTERY BYPASS GRAFT        General Information     Row Name 23 1410          OT Time and Intention    Document Type evaluation  -LF     Mode of Treatment individual therapy;occupational therapy  -     Row Name 23 1410          General Information    Patient Profile Reviewed yes  -LF     Prior Level of Function --  Pt reports that \"a few months ago\" he was (I) w/ADLs, ambulated w/a RW, has a walk-in shower w/shower chair, elevated commode, sat/stand to groom as tolerated, and no home O2. Currently at SNF requires assist w/ADLs and used a w/c for mobility.  -LF     Existing Precautions/Restrictions fall;spinal  (cervical/spinal precautions) no bending, twisting, or lifting/pushing/pulling " ">5-8 lbs  -     Barriers to Rehab none identified  -     Row Name 04/20/23 1410          Occupational Profile    Reason for Services/Referral (Occupational Profile) Patient is a 72 year old male who is currently status post anterior cervical discectomy and fusion using right approach of C3-4 on April 19th, 2023. Occupational therapy consulted due to recent decline in ADLs/functional transfers. No previous occupational therapy services for current condition.  -[facility] Name 04/20/23 1410          Living Environment    [facility]  Typically lives with his friend but recently resides in SNF for rehab needs  -[facility] Name 04/20/23 1410          Cognition    Orientation Status (Cognition) oriented x 4  -[facility] Name 04/20/23 1410          Safety Issues, Functional Mobility    Impairments Affecting Function (Mobility) balance;endurance/activity tolerance;pain  -           User Key  (r) = Recorded By, (t) = Taken By, (c) = Cosigned By    Initials Name Provider Type     Rachael Garcia OT Occupational Therapist                 Mobility/ADL's     [facility] Name 04/20/23 1418          Bed Mobility    Comment, (Bed Mobility) Patient declined bed mobility or functional transfers, stating that he was too tired from PT evaluation prior to OT's arrival and asking, \"can we just wait til after lunch?\" Per PT's evaluation he required CGA for supine to sit and sit to stand using RW.  -     Row Name 04/20/23 1418          Activities of Daily Living    BADL Assessment/Intervention bathing;upper body dressing;lower body dressing;grooming;feeding;toileting  -[facility] Name 04/20/23 1418          Bathing Assessment/Intervention    [place] Level (Bathing) bathing skills;upper body;standby assist;lower body;maximum assist (25% patient effort)  -     Row Name 04/20/23 1418          Upper Body Dressing Assessment/Training    [place] Level (Upper Body Dressing) upper body dressing skills;standby " assist  -LF     Row Name 04/20/23 1418          Lower Body Dressing Assessment/Training    [place] Level (Lower Body Dressing) lower body dressing skills;maximum assist (25% patient effort)  -LF     Row Name 04/20/23 1418          Grooming Assessment/Training    [place] Level (Grooming) grooming skills;standby assist  -LF     Row Name 04/20/23 1418          Self-Feeding Assessment/Training    [place] Level (Feeding) feeding skills;set up  -LF     Row Name 04/20/23 1418          Toileting Assessment/Training    [place] Level (Toileting) toileting skills;dependent (less than 25% patient effort)  -     Comment, (Toileting) Male purewick currently in place.  -           User Key  (r) = Recorded By, (t) = Taken By, (c) = Cosigned By    Initials Name Provider Type     Rachael Garcia OT Occupational Therapist               Obj/Interventions     Row Name 04/20/23 1421          Sensory Assessment (Somatosensory)    Sensory Assessment (Somatosensory) UE sensation intact  -LF     Row Name 04/20/23 1421          Vision Assessment/Intervention    Visual Impairment/Limitations WFL  -LF     Row Name 04/20/23 1421          Range of Motion Comprehensive    General Range of Motion bilateral upper extremity ROM WFL  -     Comment, General Range of Motion ~90° bilateral shoulder flexion, further proximal testing deferred due to recent sx.  -LF     Row Name 04/20/23 1421          Strength Comprehensive (MMT)    Comment, General Manual Muscle Testing (MMT) Assessment 4+/5 bilateral , further proximal testing deferred due to recent sx.  -LF     Row Name 04/20/23 1421          Motor Skills    Motor Skills coordination;functional endurance  -LF     Coordination bilateral;upper extremity;fine motor deficit  -LF     Functional Endurance Fair-  -LF     Row Name 04/20/23 1421          Balance    Comment, Balance Not tested, likely impaired.  -           User Key  (r) = Recorded By, (t) = Taken By, (c) =  Cosigned By    Initials Name Provider Type    LF Rachael Garcia, OT Occupational Therapist               Goals/Plan     Row Name 04/20/23 1424          Bed Mobility Goal 1 (OT)    Activity/Assistive Device (Bed Mobility Goal 1, OT) bed mobility activities, all  -LF     [place] Level/Cues Needed (Bed Mobility Goal 1, OT) modified independence  -LF     Time Frame (Bed Mobility Goal 1, OT) long term goal (LTG);10 days  -LF     Row Name 04/20/23 1424          Transfer Goal 1 (OT)    Activity/Assistive Device (Transfer Goal 1, OT) transfers, all;walker, rolling  -LF     [place] Level/Cues Needed (Transfer Goal 1, OT) modified independence  -LF     Time Frame (Transfer Goal 1, OT) long term goal (LTG);10 days  -LF     Row Name 04/20/23 1424          Bathing Goal 1 (OT)    Activity/Device (Bathing Goal 1, OT) bathing skills, all;lower body bathing  -LF     [place] Level/Cues Needed (Bathing Goal 1, OT) standby assist  -LF     Time Frame (Bathing Goal 1, OT) long term goal (LTG);10 days  -LF     Row Name 04/20/23 1424          Dressing Goal 1 (OT)    Activity/Device (Dressing Goal 1, OT) dressing skills, all  -LF     [place]/Cues Needed (Dressing Goal 1, OT) standby assist  -LF     Time Frame (Dressing Goal 1, OT) long term goal (LTG);10 days  -LF     Row Name 04/20/23 1424          Toileting Goal 1 (OT)    Activity/Device (Toileting Goal 1, OT) toileting skills, all  -LF     [place] Level/Cues Needed (Toileting Goal 1, OT) standby assist  -LF     Time Frame (Toileting Goal 1, OT) long term goal (LTG);10 days  -LF     Row Name 04/20/23 1424          Grooming Goal 1 (OT)    Activity/Device (Grooming Goal 1, OT) grooming skills, all  -LF     [place] (Grooming Goal 1, OT) set-up required  -LF     Time Frame (Grooming Goal 1, OT) long term goal (LTG);10 days  -LF     Row Name 04/20/23 1424          Problem Specific Goal 1 (OT)    Problem Specific Goal 1 (OT) Patient will demonstrate fair+  endurance to support ADLs/functional transfers.  -     Time Frame (Problem Specific Goal 1, OT) long term goal (LTG);10 days  -     Row Name 04/20/23 1429          Therapy Assessment/Plan (OT)    Planned Therapy Interventions (OT) activity tolerance training;patient/caregiver education/training;BADL retraining;functional balance retraining;occupation/activity based interventions;strengthening exercise;transfer/mobility retraining  -           User Key  (r) = Recorded By, (t) = Taken By, (c) = Cosigned By    Initials Name Provider Type     Rachael Garcia, OT Occupational Therapist               Clinical Impression     Row Name 04/20/23 1423          Pain Assessment    Additional Documentation Pain Scale: FACES Pre/Post-Treatment (Group)  -     Row Name 04/20/23 1423          Pain Scale: FACES Pre/Post-Treatment    Pain: FACES Scale, Pretreatment 2-->hurts little bit  -     Posttreatment Pain Rating 2-->hurts little bit  -     Row Name 04/20/23 1423          Plan of Care Review    Plan of Care Reviewed With patient  -     Progress no change  -     Outcome Evaluation Patient presents with limitations in self-care, functional transfers, balance, and endurance. He would benefit from continued skilled occupational therapy services to maximize independence with ADLs/functional transfers. Inpatient rehab facility recommended upon discharge from hospital.  -     Row Name 04/20/23 1423          Therapy Assessment/Plan (OT)    Patient/Family Therapy Goal Statement (OT) To maximize independence.  -     Rehab Potential (OT) good, to achieve stated therapy goals  -     Criteria for Skilled Therapeutic Interventions Met (OT) yes;meets criteria;skilled treatment is necessary  -     Therapy Frequency (OT) 5 times/wk  -     Row Name 04/20/23 1423          Therapy Plan Review/Discharge Plan (OT)    Anticipated Discharge Disposition (OT) inpatient rehabilitation facility  -     Row Name 04/20/23 142           Vital Signs    O2 Delivery Pre Treatment room air  -LF     O2 Delivery Intra Treatment room air  -LF     O2 Delivery Post Treatment room air  -LF           User Key  (r) = Recorded By, (t) = Taken By, (c) = Cosigned By    Initials Name Provider Type    Rachael Saab OT Occupational Therapist               Outcome Measures     Row Name 04/20/23 1425          How much help from another is currently needed...    Putting on and taking off regular lower body clothing? 2  -LF     Bathing (including washing, rinsing, and drying) 2  -LF     Toileting (which includes using toilet bed pan or urinal) 1  -LF     Putting on and taking off regular upper body clothing 3  -LF     Taking care of personal grooming (such as brushing teeth) 3  -LF     Eating meals 4  -LF     AM-PAC 6 Clicks Score (OT) 15  -LF     Row Name 04/20/23 1000 04/20/23 0815       How much help from another person do you currently need...    Turning from your back to your side while in flat bed without using bedrails? 4  -SIVA 4  -AR    Moving from lying on back to sitting on the side of a flat bed without bedrails? 4  -SIVA 3  -AR    Moving to and from a bed to a chair (including a wheelchair)? 2  -SIVA 2  -AR    Standing up from a chair using your arms (e.g., wheelchair, bedside chair)? 3  -SIVA 2  -AR    Climbing 3-5 steps with a railing? 1  -SIVA 1  -AR    To walk in hospital room? 2  -SIVA 2  -AR    AM-PAC 6 Clicks Score (PT) 16  -SIVA 14  -AR    Highest level of mobility 5 --> Static standing  -SIVA 4 --> Transferred to chair/commode  -AR    Row Name 04/20/23 1425 04/20/23 1000       Functional Assessment    Outcome Measure Options AM-PAC 6 Clicks Daily Activity (OT);Optimal Instrument  -LF AM-PAC 6 Clicks Basic Mobility (PT)  -SIVA    Row Name 04/20/23 1425          Optimal Instrument    Optimal Instrument Optimal - 3  -LF     Bending/Stooping 4  -LF     Standing 2  -LF     Reaching 2  -LF     From the list, choose the 3 activities you would most like to be  able to do without any difficulty Bending/stooping;Standing;Reaching  -LF     Total Score Optimal - 3 8  -LF           User Key  (r) = Recorded By, (t) = Taken By, (c) = Cosigned By    Initials Name Provider Type    Umu Brenner, RN Registered Nurse     Rachael Garcia OT Occupational Therapist    Angelo Wise, PT Physical Therapist                Occupational Therapy Education     Title: PT OT SLP Therapies (Done)     Topic: Occupational Therapy (Done)     Point: ADL training (Done)     Description:   Instruct learner(s) on proper safety adaptation and remediation techniques during self care or transfers.   Instruct in proper use of assistive devices.              Learning Progress Summary           Patient Acceptance, E,TB, VU by  at 4/20/2023 1425                   Point: Precautions (Done)     Description:   Instruct learner(s) on prescribed precautions during self-care and functional transfers.              Learning Progress Summary           Patient Acceptance, E,TB, VU by  at 4/20/2023 1425                   Point: Body mechanics (Done)     Description:   Instruct learner(s) on proper positioning and spine alignment during self-care, functional mobility activities and/or exercises.              Learning Progress Summary           Patient Acceptance, E,TB, VU by  at 4/20/2023 1425                               User Key     Initials Effective Dates Name Provider Type Discipline     06/16/21 -  Rachael Garcia OT Occupational Therapist OT              OT Recommendation and Plan  Planned Therapy Interventions (OT): activity tolerance training, patient/caregiver education/training, BADL retraining, functional balance retraining, occupation/activity based interventions, strengthening exercise, transfer/mobility retraining  Therapy Frequency (OT): 5 times/wk  Plan of Care Review  Plan of Care Reviewed With: patient  Progress: no change  Outcome Evaluation: Patient presents with limitations  in self-care, functional transfers, balance, and endurance. He would benefit from continued skilled occupational therapy services to maximize independence with ADLs/functional transfers. Inpatient rehab facility recommended upon discharge from hospital.     Time Calculation:    Time Calculation- OT     Row Name 04/20/23 1426             Time Calculation- OT    OT Received On 04/20/23  -LF      OT Goal Re-Cert Due Date 04/29/23  -LF         Untimed Charges    OT Eval/Re-eval Minutes 33  -LF         Total Minutes    Untimed Charges Total Minutes 33  -LF       Total Minutes 33  -LF            User Key  (r) = Recorded By, (t) = Taken By, (c) = Cosigned By    Initials Name Provider Type    LF Rachael Garcia OT Occupational Therapist              Therapy Charges for Today     Code Description Service Date Service Provider Modifiers Qty    [id number] HC OT EVAL LOW COMPLEXITY 3 4/20/2023 Rachael Garcia OT GO 1               Rachael Garcia OT  4/20/2023

## 2023-04-20 NOTE — PLAN OF CARE
Goal Outcome Evaluation:  Plan of Care Reviewed With: patient           Outcome Evaluation: Pt is currently having difficulty walking, has decreased strength, decreased motor control, impaired balance, and decreased activity tolerance. Pt would benefit from skilled physical therapy services while in hospital. Recommend discharge to inpatient rehab.

## 2023-04-20 NOTE — THERAPY EVALUATION
Acute Care - Physical Therapy Initial Evaluation   [facility]     Patient Name: Felix Cortez  : 1950  MRN: [id number]  Today's Date: 2023      Visit Dx:     ICD-10-CM ICD-9-CM   1. Difficulty in walking  R26.2 719.7   2. Cervical cord myelomalacia  G95.89 336.8   3. Myelopathy concurrent with and due to spinal stenosis of cervical region  M48.02 723.0    G99.2 336.3     Patient Active Problem List   Diagnosis   • Cervical cord myelomalacia   • Myelopathy concurrent with and due to spinal stenosis of cervical region     Past Medical History:   Diagnosis Date   • Coronary artery disease     BYPASS 2019? 3 V/FOLLOWS [place]. LEELA MADISON   • DDD (degenerative disc disease), cervical    • Deep vein thrombosis    • Diabetes mellitus     ORAL MED   • Hyperlipidemia    • Hypertension    • Smoker      Past Surgical History:   Procedure Laterality Date   • ANTERIOR CERVICAL DISCECTOMY W/ FUSION Right 2023    Procedure: ANTERIOR CERVICAL DISCECTOMY AND FUSION USING ALLOGRAFT BONE AND INSTRUMENTATION, right approach, cervical 3-cervical 4;  Surgeon: Brett Patterson MD;  Location: [facility];  Service: Neurosurgery;  Laterality: Right;   • CORONARY ARTERY BYPASS GRAFT       PT Assessment (last 12 hours)     PT Evaluation and Treatment     Row Name 23 0951          Physical Therapy Time and Intention    Subjective Information no complaints  -SIVA     Document Type evaluation  -SIVA     Mode of Treatment individual therapy;physical therapy  -SIVA     Patient Effort good  -SIVA     Symptoms Noted During/After Treatment none  -SIVA     Row Name 23 0951          General Information    Patient Profile Reviewed yes  -SIVA     Patient Observations alert;cooperative;agree to therapy  -SIVA     Prior Level of Function dependent:;all household mobility  for 2 months in a nursing home, prior was living with friends and was min A with rolling walker  -SIVA     Row Name 23 0951          Living Environment     Current Living Arrangements residential facility  nursing home for 2 months, lived in basement of friends house prior  -SIVA     Home Accessibility other (see comments)  none in nursing home, 3 [address] friend's basement  -SIVA     People in Home facility resident;other (see comments)  in nursing home, prior lived with friends  -SIVA     Name(s) of People in Home lEicia  -SIVA     Primary Care Provided by other (see comments)  facility [facility] last 2 months, prior took care of himself with some assistance from friends  -SIVA     Row Name 04/20/23 0951          Home Use of Assistive/Adaptive Equipment    Equipment Currently Used at Home walker, standard;glucometer;wheelchair  -SIVA     Row Name 04/20/23 0951          Strength (Manual Muscle Testing)    Strength (Manual Muscle Testing) --  Bilateral LE: grossly 4/5  -SIVA     Row Name 04/20/23 0951          Bed Mobility    Bed Mobility bed mobility (all) activities  -SIVA     All Activities, [place] (Bed Mobility) contact guard  -SIVA     Assistive Device (Bed Mobility) bed rails  -SIVA     Row Name 04/20/23 0951          Transfers    Transfers sit-stand transfer;stand-sit transfer  -SIVA     Row Name 04/20/23 0951          Sit-Stand Transfer    Sit-Stand [place] (Transfers) contact guard  -SIVA     Assistive Device (Sit-Stand Transfers) walker, front-wheeled  -SIVA     Row Name 04/20/23 0951          Stand-Sit Transfer    Stand-Sit [place] (Transfers) contact guard  -SIVA     Assistive Device (Stand-Sit Transfers) walker, front-wheeled  -SIVA     Row Name 04/20/23 0951          Gait/Stairs (Locomotion)    Gait/Stairs Locomotion other (see comments)  not tested, due to pt's inability to put weight on R leg. Pt's right leg buckled when he shifted weight onto it. Pt could put some weight onto R leg when hip and knee were manually stabilized.  -SIVA     Row Name 04/20/23 0951          Safety Issues, Functional Mobility    Impairments Affecting Function (Mobility)  balance;endurance/activity tolerance;motor control;pain;range of motion (ROM);strength  -SIVA     Row Name 04/20/23 0951          Balance    Balance Assessment standing dynamic balance  -SIVA     Dynamic Standing Balance contact guard  -SIVA     Position/Device Used, Standing Balance walker, front-wheeled  -SIVA     Row Name             Wound 04/19/23 1146 throat Incision    Wound - Properties Group Placement Date: 04/19/23  -LIBIA Placement Time: 1146 -LIBIA Location: throat  -LIBIA Primary Wound Type: Incision  -LIBIA    Retired Wound - Properties Group Placement Date: 04/19/23  -LIBIA Placement Time: 1146 -LIBIA Location: throat  -LIBIA Primary Wound Type: Incision  -LIBIA    Retired Wound - Properties Group Date first assessed: 04/19/23  -LIBIA Time first assessed: 1146 -[facility] Location: throat  -LIBIA Primary Wound Type: Incision  -LIBIA    Row Name 04/20/23 0951          Plan of Care Review    Plan of Care Reviewed With patient  -SIVA     Outcome Evaluation Pt is currently having difficulty walking, has decreased strength, decreased motor control, impaired balance, and decreased activity tolerance. Pt would benefit from skilled physical therapy services while in hospital. Recommend discharge to inpatient rehab.  -SIVA     Row Name 04/20/23 0951          Positioning and Restraints    Pre-Treatment Position in bed  -SIVA     Post Treatment Position bed  -SIVA     In Bed sitting EOB;call light within reach  -SIVA     Row Name 04/20/23 0951          Therapy Assessment/Plan (PT)    Rehab Potential (PT) good, to achieve stated therapy goals  -SIVA     Criteria for Skilled Interventions Met (PT) skilled treatment is necessary  -SIVA     Therapy Frequency (PT) daily  -SIVA     Predicted Duration of Therapy Intervention (PT) 10 days  -SIVA     Problem List (PT) problems related to;balance;coordination;mobility;motor control;strength  -SIVA     Activity Limitations Related to Problem List (PT) unable to ambulate safely;unable to transfer safely  -SIVA     Row Name 04/20/23 0951           Therapy Plan Review/Discharge Plan (PT)    Therapy Plan Review (PT) evaluation/treatment results reviewed;participants included;patient  -SIVA     Row Name 04/20/23 0951          Physical Therapy Goals    Transfer Goal Selection (PT) transfer, PT goal 1  -SIVA     Gait Training Goal Selection (PT) gait training, PT goal 1  -SIVA     Problem Specific Goal Selection (PT) problem specific goal 1, PT  -SIVA     Row Name 04/20/23 0951          Transfer Goal 1 (PT)    Activity/Assistive Device (Transfer Goal 1, PT) transfers, all  -SIVA     [place] Level/Cues Needed (Transfer Goal 1, PT) standby assist  -SIVA     Time Frame (Transfer Goal 1, PT) long term goal (LTG);10 days  -SIVA     Row Name 04/20/23 0951          Gait Training Goal 1 (PT)    Activity/Assistive Device (Gait Training Goal 1, PT) gait (walking locomotion);assistive device use;walker, rolling  -SIVA     [place] Level (Gait Training Goal 1, PT) contact guard required  -SIVA     Distance (Gait Training Goal 1, PT) 50 feet  -SIVA     Time Frame (Gait Training Goal 1, PT) long term goal (LTG);10 days  -SIVA     Row Name 04/20/23 0951          Problem Specific Goal 1 (PT)    Problem Specific Goal 1 (PT) Pt will demonstrate equal weight shift on right LE without R knee buckling.  -SIVA     Time Frame (Problem Specific Goal 1, PT) long-term goal (LTG);other (see comments)  10 days  -SIVA           User Key  (r) = Recorded By, (t) = Taken By, (c) = Cosigned By    Initials Name Provider Type    Elif Coffman, RN Registered Nurse    Angelo Wise, PT Physical Therapist                Physical Therapy Education     Title: PT OT SLP Therapies (Done)     Topic: Physical Therapy (Done)     Point: Mobility training (Done)     Learning Progress Summary           Patient Acceptance, E,TB, VU by SIVA at 4/20/2023 1023                   Point: Precautions (Done)     Learning Progress Summary           Patient Acceptance, E,TB, VU by SIVA at 4/20/2023 1023                                User Key     Initials Effective Dates Name Provider Type Discipline    SIVA 06/03/21 -  Angelo Villanueva PT Physical Therapist PT              PT Recommendation and Plan  Anticipated Discharge Disposition (PT): inpatient rehabilitation facility  Planned Therapy Interventions (PT): balance training, bed mobility training, gait training, ROM (range of motion), stair training, strengthening, stretching, transfer training, motor coordination training  Therapy Frequency (PT): daily  Plan of Care Reviewed With: patient  Outcome Evaluation: Pt is currently having difficulty walking, has decreased strength, decreased motor control, impaired balance, and decreased activity tolerance. Pt would benefit from skilled physical therapy services while in hospital. Recommend discharge to inpatient rehab.   Outcome Measures     Row Name 04/20/23 1000             How much help from another person do you currently need...    Turning from your back to your side while in flat bed without using bedrails? 4  -SIVA      Moving from lying on back to sitting on the side of a flat bed without bedrails? 4  -SIVA      Moving to and from a bed to a chair (including a wheelchair)? 2  -SIVA      Standing up from a chair using your arms (e.g., wheelchair, bedside chair)? 3  -SIVA      Climbing 3-5 steps with a railing? 1  -SIVA      To walk in hospital room? 2  -SIVA      AM-PAC 6 Clicks Score (PT) 16  -SIVA         Functional Assessment    Outcome Measure Options AM-PAC 6 Clicks Basic Mobility (PT)  -SIVA            User Key  (r) = Recorded By, (t) = Taken By, (c) = Cosigned By    Initials Name Provider Type    SIVA Angelo Villanueva PT Physical Therapist                 Time Calculation:    PT Charges     Row Name 04/20/23 0951             Time Calculation    PT Received On 04/20/23  -SIVA      PT Goal Re-Cert Due Date 04/29/23  -SIVA         Untimed Charges    PT Eval/Re-eval Minutes 30  -SIVA         Total Minutes    Untimed Charges Total Minutes 30  -SIVA        Total Minutes 30  -SIVA            User Key  (r) = Recorded By, (t) = Taken By, (c) = Cosigned By    Initials Name Provider Type    Angelo Wise, PT Physical Therapist              Therapy Charges for Today     Code Description Service Date Service Provider Modifiers Qty    54365242803 HC PT EVAL LOW COMPLEXITY 2 4/20/2023 Angelo Villanueva, PT GP 1          PT G-Codes  Outcome Measure Options: AM-PAC 6 Clicks Basic Mobility (PT)  AM-PAC 6 Clicks Score (PT): 16    Angelo Villanueva, PT  4/20/2023

## 2023-04-21 VITALS
SYSTOLIC BLOOD PRESSURE: 145 MMHG | RESPIRATION RATE: 18 BRPM | TEMPERATURE: 98.2 F | BODY MASS INDEX: 29.9 KG/M2 | HEART RATE: 59 BPM | WEIGHT: 179.45 LBS | OXYGEN SATURATION: 94 % | DIASTOLIC BLOOD PRESSURE: 62 MMHG | HEIGHT: 65 IN

## 2023-04-21 PROCEDURE — 63710000001 VITAMIN D3 125 MCG (5000 UT) CAPSULE: Performed by: NEUROLOGICAL SURGERY

## 2023-04-21 PROCEDURE — 94799 UNLISTED PULMONARY SVC/PX: CPT

## 2023-04-21 PROCEDURE — A9270 NON-COVERED ITEM OR SERVICE: HCPCS | Performed by: NEUROLOGICAL SURGERY

## 2023-04-21 PROCEDURE — 63710000001 OXYCODONE-ACETAMINOPHEN 5-325 MG TABLET: Performed by: NEUROLOGICAL SURGERY

## 2023-04-21 PROCEDURE — 97530 THERAPEUTIC ACTIVITIES: CPT

## 2023-04-21 PROCEDURE — 63710000001 CARVEDILOL 6.25 MG TABLET: Performed by: NEUROLOGICAL SURGERY

## 2023-04-21 PROCEDURE — 99024 POSTOP FOLLOW-UP VISIT: CPT | Performed by: NEUROLOGICAL SURGERY

## 2023-04-21 PROCEDURE — 63710000001 FOLIC ACID 1 MG TABLET: Performed by: NEUROLOGICAL SURGERY

## 2023-04-21 PROCEDURE — 63710000001 GLIPIZIDE 5 MG TABLET: Performed by: NEUROLOGICAL SURGERY

## 2023-04-21 PROCEDURE — 63710000001 ESCITALOPRAM 10 MG TABLET: Performed by: NEUROLOGICAL SURGERY

## 2023-04-21 PROCEDURE — 63710000001 SACUBITRIL-VALSARTAN 24-26 MG TABLET: Performed by: NEUROLOGICAL SURGERY

## 2023-04-21 PROCEDURE — 63710000001 PANTOPRAZOLE 40 MG TABLET DELAYED-RELEASE: Performed by: NEUROLOGICAL SURGERY

## 2023-04-21 PROCEDURE — 63710000001 FUROSEMIDE 20 MG TABLET: Performed by: NEUROLOGICAL SURGERY

## 2023-04-21 PROCEDURE — 97116 GAIT TRAINING THERAPY: CPT

## 2023-04-21 RX ORDER — OXYCODONE HYDROCHLORIDE AND ACETAMINOPHEN 5; 325 MG/1; MG/1
1 TABLET ORAL EVERY 4 HOURS PRN
Qty: 25 TABLET | Refills: 0 | Status: SHIPPED | OUTPATIENT
Start: 2023-04-21

## 2023-04-21 RX ADMIN — Medication 1 MG: at 08:24

## 2023-04-21 RX ADMIN — GLIPIZIDE 5 MG: 5 TABLET ORAL at 08:24

## 2023-04-21 RX ADMIN — ESCITALOPRAM 10 MG: 10 TABLET, FILM COATED ORAL at 06:14

## 2023-04-21 RX ADMIN — PANTOPRAZOLE SODIUM 40 MG: 40 TABLET, DELAYED RELEASE ORAL at 06:14

## 2023-04-21 RX ADMIN — OXYCODONE AND ACETAMINOPHEN 1 TABLET: 5; 325 TABLET ORAL at 06:13

## 2023-04-21 RX ADMIN — Medication 5000 UNITS: at 08:24

## 2023-04-21 RX ADMIN — CARVEDILOL 6.25 MG: 6.25 TABLET, FILM COATED ORAL at 08:24

## 2023-04-21 RX ADMIN — SACUBITRIL AND VALSARTAN 1 TABLET: 24; 26 TABLET, FILM COATED ORAL at 08:24

## 2023-04-21 NOTE — SIGNIFICANT NOTE
04/21/23 0912   Plan   Plan Patient is here for outpatient surgery.  Currently at [facility] for the past 2 months. RYLEY contacted Rachel, in admission, and patient can return today.  Reports he will need TACK for transportation.  Patient is agreeable to return and use TACK.  Information faxed to [phone] as reqeusted by Rachel.  RYLEY informed primary RN of information and admission MD.  Verified return address as [address] in [place]

## 2023-04-21 NOTE — DISCHARGE SUMMARY
Date of Discharge:  4/21/2023    Discharge Diagnosis: Cervical spinal stenosis with myelomalacia status post ACDF.    Presenting Problem/History of Present Illness  Active Hospital Problems    Diagnosis  POA   • **Cervical cord myelomalacia [G95.89]  Yes   • Myelopathy concurrent with and due to spinal stenosis of cervical region [M48.02, G99.2]  Yes      Resolved Hospital Problems   No resolved problems to display.        Hospital Course  Patient is a 72 y.o. male presented with progressive gait difficulty with cervical spinal stenosis most notably at C3-4 with notable myelomalacia at this level.  Secondary to his progressive decline he opted for decompression.  He underwent an uncomplicated ACDF on 4/19/2023.  He was admitted to the floor postoperatively.  On postoperative day 1 he reported improved movement and strength in his upper extremities with decreased numbness.  His legs also showed improvement.  By postoperative day 2 he was felt to be stable to be discharged back to his nursing facility to hopefully begin his physical therapy and further rehab.  [facility]      Procedures Performed    Procedure(s):  ANTERIOR CERVICAL DISCECTOMY AND FUSION USING ALLOGRAFT BONE AND INSTRUMENTATION, right approach, cervical 3-cervical 4  -------------------       Consults:   Consults     No orders found from 3/21/2023 to 4/20/2023.          Condition on Discharge: Improved    Vital Signs  Temp:  [97.4 °F (36.3 °C)-98.6 °F (37 °C)] 98.1 °F (36.7 °C)  Heart Rate:  [52-61] 60  Resp:  [16-18] 18  BP: (120-153)/(44-64) 151/60    Physical Exam:  Still with signs of myelopathy on exam including some hyperreflexia but overall improved strength particular in the upper extremities compared to preoperatively.  Dressing was clean and dry.    Discharge Disposition  Skilled Nursing Facility (DC - External)    Discharge Medications     Discharge Medications      New Medications       Instructions Start Date   oxyCODONE-acetaminophen 5-325 MG per tablet  Commonly known as: PERCOCET   1 tablet, Oral, Every 4 Hours PRN         Continue These Medications      Instructions Start Date   acetaminophen 325 MG tablet  Commonly known as: TYLENOL   650 mg, Oral, Every 6 Hours PRN      atorvastatin 40 MG tablet  Commonly known as: LIPITOR   Take 1 tablet by mouth Every Night.      carvedilol 6.25 MG tablet  Commonly known as: COREG   6.25 mg, Oral, 2 Times Daily With Meals, INST PER ANESTHESIA PROTOCOL      clopidogrel 75 MG tablet  Commonly known as: PLAVIX   75 mg, Oral, Daily, LAST DOSE 4/13/23      Diclofenac Sodium 1 % gel gel  Commonly known as: VOLTAREN   4 g, Topical, 4 Times Daily PRN, LAST DOSE 4/14/23      Entresto 24-26 MG tablet  Generic drug: sacubitril-valsartan   1 tablet, Oral, 2 Times Daily, INST PER ANESTHESIA PROTOCOL, ARB      escitalopram 10 MG tablet  Commonly known as: LEXAPRO   10 mg, Oral, Every Morning      folic acid 1 MG tablet  Commonly known as: FOLVITE   Take 1 tablet by mouth Daily. HOLD PREOP LAST DOSE 4/14/23      furosemide 20 MG tablet  Commonly known as: LASIX   20 mg, Oral, 2 Times Daily, INST PER ANESTHESIA PROTOCOL      gabapentin 100 MG capsule  Commonly known as: NEURONTIN   100 mg, Oral, Nightly      glyburide 2.5 MG tablet  Commonly known as: DIAbeta   2.5 mg, Oral, Daily With Breakfast, INST PER ANESTHESIA PROTOCOL      ipratropium-albuterol 0.5-2.5 mg/3 ml nebulizer  Commonly known as: DUO-NEB   Take 3 mL by nebulization 4 (Four) Times a Day.      Lidocaine 4 % patch   Apply externally, LAST DOSE 4/18/23      melatonin 5 MG tablet tablet   5 mg, Oral, Nightly PRN, LAST DOSE 4/14/23      omeprazole 20 MG capsule  Commonly known as: priLOSEC   20 mg, Oral, 2 Times Daily      vitamin D3 125 MCG (5000 UT) capsule capsule   Take 1 capsule by mouth Daily. LAST DOSE 4/14/23         Stop These Medications    traMADol 50 MG tablet  Commonly known as: ULTRAM             Discharge Diet:     Activity at Discharge:   Activity Instructions     Discharge Activity      1) No driving for 5-7 days and no longer taking narcotics.   2) May shower as soon as desired, no submerging incision.  3) Do not lift / push / pull more than 8-10 lbs.  4) Minimize bending/twisting at the waist and jarring activity      such as lawnmower.          Follow-up Appointments  Future Appointments   Date Time Provider Department Center   5/9/2023  3:45 PM Brett Patterson MD [facility]     Additional Instructions for the Follow-ups that You Need to Schedule     Notify Physician or Go To The ED For the Following Conditions   As directed      New numbness, weakness, difficulty controlling bowel or bladder function or other concerns.    Order Comments: New numbness, weakness, difficulty controlling bowel or bladder function or other concerns.                Test Results Pending at Discharge: None       Brett Patterson MD  04/21/23  10:04 EDT

## 2023-04-21 NOTE — THERAPY TREATMENT NOTE
Acute Care - Physical Therapy Treatment Note   [facility]     Patient Name: Felix Cortez  : 1950  MRN: [id number]  Today's Date: 2023      Visit Dx:     ICD-10-CM ICD-9-CM   1. Difficulty in walking  R26.2 719.7   2. Cervical cord myelomalacia  G95.89 336.8   3. Myelopathy concurrent with and due to spinal stenosis of cervical region  M48.02 723.0    G99.2 336.3   4. Decreased activities of daily living (ADL)  Z78.9 V49.89     Patient Active Problem List   Diagnosis   • Cervical cord myelomalacia   • Myelopathy concurrent with and due to spinal stenosis of cervical region     Past Medical History:   Diagnosis Date   • Coronary artery disease     BYPASS ? 3 V/FOLLOWS MALIK MADISON   • DDD (degenerative disc disease), cervical    • Deep vein thrombosis    • Diabetes mellitus     ORAL MED   • Hyperlipidemia    • Hypertension    • Smoker      Past Surgical History:   Procedure Laterality Date   • ANTERIOR CERVICAL DISCECTOMY W/ FUSION Right 2023    Procedure: ANTERIOR CERVICAL DISCECTOMY AND FUSION USING ALLOGRAFT BONE AND INSTRUMENTATION, right approach, cervical 3-cervical 4;  Surgeon: Brett Patterson MD;  Location: [facility];  Service: Neurosurgery;  Laterality: Right;   • CORONARY ARTERY BYPASS GRAFT       PT Assessment (last 12 hours)     PT Evaluation and Treatment     Row Name 23 1306          Physical Therapy Time and Intention    Document Type therapy note (daily note)  -AV     Mode of Treatment individual therapy;physical therapy  -AV     Patient Effort good  -AV     Row Name 23 1306          General Information    Patient Profile Reviewed yes  -AV     Patient Observations alert;cooperative;agree to therapy  -AV     Row Name 23 1320          Bed Mobility    Comment, (Bed Mobility) Patient seated upright in recliner upon therapist entry.  -AV     Row Name 23 1320          Transfers    Transfers sit-stand transfer;stand-sit transfer  -AV      Comment, (Transfers) Patient completed 6 sit-stand transfers throughout treatment session. On initial stand patient unable to bear weight on RLE without buckling noted. With subsequent stands patient able to more equally bear weight between BLE without R knee buckling. Patient demonstrated safety with appropriate hand position with each transfer.  -AV     Row Name 04/21/23 1320          Sit-Stand Transfer    Sit-Stand [place] (Transfers) minimum assist (75% patient effort)  -AV     Assistive Device (Sit-Stand Transfers) walker, front-wheeled  -AV     Row Name 04/21/23 1320          Stand-Sit Transfer    Stand-Sit [place] (Transfers) minimum assist (75% patient effort)  -AV     Assistive Device (Stand-Sit Transfers) walker, front-wheeled  -AV     Row Name 04/21/23 1320          Gait/Stairs (Locomotion)    Gait/Stairs Locomotion gait/ambulation independence;gait/ambulation assistive device;distance ambulated  -AV     [place] Level (Gait) minimum assist (75% patient effort)  -AV     Assistive Device (Gait) walker, front-wheeled  -AV     Distance in Feet (Gait) 3 x2, 5 x2, 8  -AV     Pattern (Gait) step-to  -AV     Deviations/Abnormal Patterns (Gait) gait speed decreased;stride length decreased  -AV     Bilateral Gait Deviations forward flexed posture  -AV     Right Sided Gait Deviations hip circumduction  -AV     Row Name 04/21/23 1320          Balance    Balance Assessment standing dynamic balance  -AV     Dynamic Standing Balance minimal assist  -AV     Position/Device Used, Standing Balance supported;walker, front-wheeled  -AV     Row Name 04/21/23 1320          Motor Skills    Comments, Therapeutic Exercise Pt demonstrated to therapist seated exercises he has been completing throughout the day, which includes: seated marching, hip abduction and adduction, LAQ, ankle pumps and circles. Therapist instructed pt in quad sets and glute sets x15. Educated patient on purpose of these exercises and to  include these in his exercise routine. Patient demonstrate exercises with appropriate technique.  -AV     Additional Documentation Comments, Therapeutic Exercise (Row)  -AV     Row Name             Wound 04/19/23 1146 throat Incision    Wound - Properties Group Placement Date: 04/19/23  -LIBIA Placement Time: 1146 -LIBIA Location: throat  -LIBIA Primary Wound Type: Incision  -LIBIA    Retired Wound - Properties Group Placement Date: 04/19/23  -LIBIA Placement Time: 1146  -LIBIA Location: throat  -LIBIA Primary Wound Type: Incision  -LIBIA    Retired Wound - Properties Group Date first assessed: 04/19/23  -LIBIA Time first assessed: 1146 -LIBIA Location: throat  -LIBIA Primary Wound Type: Incision  -LIBIA    Row Name 04/21/23 1320          Positioning and Restraints    Pre-Treatment Position sitting in chair/recliner  -AV     Post Treatment Position chair  -AV     Row Name 04/21/23 1320          Progress Summary (PT)    Progress Toward Functional Goals (PT) progress toward functional goals is good  -AV           User Key  (r) = Recorded By, (t) = Taken By, (c) = Cosigned By    Initials Name Provider Type    Elif Coffman, RN Registered Nurse    Keagan Kaiser, PT Physical Therapist                Physical Therapy Education     Title: PT OT SLP Therapies (Resolved)     Topic: Physical Therapy (Resolved)     Point: Mobility training (Resolved)     Learning Progress Summary           Patient Acceptance, E,TB, VU by SIVA at 4/20/2023 1023                   Point: Precautions (Resolved)     Learning Progress Summary           Patient Acceptance, E,TB, VU by SIVA at 4/20/2023 1023                               User Key     Initials Effective Dates Name Provider Type Discipline    SIVA 06/03/21 -  Angelo Villanueva, PT Physical Therapist PT              PT Recommendation and Plan     Progress Summary (PT)  Progress Toward Functional Goals (PT): progress toward functional goals is good   Outcome Measures     Row Name 04/21/23 1327 04/20/23 1000           How much help from another person do you currently need...    Turning from your back to your side while in flat bed without using bedrails? 4  -AV 4  -SIVA     Moving from lying on back to sitting on the side of a flat bed without bedrails? 3  -AV 4  -SIVA     Moving to and from a bed to a chair (including a wheelchair)? 3  -AV 2  -SIVA     Standing up from a chair using your arms (e.g., wheelchair, bedside chair)? 3  -AV 3  -SIVA     Climbing 3-5 steps with a railing? 2  -AV 1  -SIVA     To walk in hospital room? 2  -AV 2  -SIVA     AM-PAC 6 Clicks Score (PT) 17  -AV 16  -SIVA        Functional Assessment    Outcome Measure Options AM-PAC 6 Clicks Basic Mobility (PT)  -AV AM-PAC 6 Clicks Basic Mobility (PT)  -SIVA           User Key  (r) = Recorded By, (t) = Taken By, (c) = Cosigned By    Initials Name Provider Type    Angelo Wise, PT Physical Therapist    AV Keagan Ramírez, PT Physical Therapist                 Time Calculation:    PT Charges     Row Name 04/21/23 1316             Time Calculation    PT Received On 04/21/23  -AV         Timed Charges    42786 - PT Therapeutic Exercise Minutes 3  -AV      24512 - Gait Training Minutes  8  -AV      55515 - PT Therapeutic Activity Minutes 15  -AV         Total Minutes    Timed Charges Total Minutes 26  -AV       Total Minutes 26  -AV            User Key  (r) = Recorded By, (t) = Taken By, (c) = Cosigned By    Initials Name Provider Type    AV Keagan Ramírez, PT Physical Therapist              Therapy Charges for Today     Code Description Service Date Service Provider Modifiers Qty    54104779202 HC PT THERAPEUTIC ACT EA 15 MIN 4/21/2023 Keagan Ramírez, PT GP 1    31298323219 HC GAIT TRAINING EA 15 MIN 4/21/2023 Keagan Ramírez, PT GP 1          PT G-Codes  Outcome Measure Options: AM-PAC 6 Clicks Basic Mobility (PT)  AM-PAC 6 Clicks Score (PT): 17  AM-PAC 6 Clicks Score (OT): 15    Keagan Ramírez PT  4/21/2023

## 2023-04-21 NOTE — PLAN OF CARE
Goal Outcome Evaluation:  Plan of Care Reviewed With: patient        Progress: no change       VSS, room air. Up to chair with x1 assist. Neurovascular assessment WDL. Pain controlled with oral pain medication per MAR. No nausea, tolerating diet. Plan to d/c back to [facility] today via T[facility]. Patient agreeable to plan. Education and written material provided. Will CTM and provide care.

## 2023-04-21 NOTE — THERAPY TREATMENT NOTE
Patient Name: Felix Cortez  : 1950    MRN: [id number]                              Today's Date: 2023       Admit Date: 2023    Visit Dx:     ICD-10-CM ICD-9-CM   1. Difficulty in walking  R26.2 719.7   2. Cervical cord myelomalacia  G95.89 336.8   3. Myelopathy concurrent with and due to spinal stenosis of cervical region  M48.02 723.0    G99.2 336.3   4. Decreased activities of daily living (ADL)  Z78.9 V49.89     Patient Active Problem List   Diagnosis   • Cervical cord myelomalacia   • Myelopathy concurrent with and due to spinal stenosis of cervical region     Past Medical History:   Diagnosis Date   • Coronary artery disease     BYPASS ? 3 V/FOLLOWS MALIK MADISON   • DDD (degenerative disc disease), cervical    • Deep vein thrombosis    • Diabetes mellitus     ORAL MED   • Hyperlipidemia    • Hypertension    • Smoker      Past Surgical History:   Procedure Laterality Date   • ANTERIOR CERVICAL DISCECTOMY W/ FUSION Right 2023    Procedure: ANTERIOR CERVICAL DISCECTOMY AND FUSION USING ALLOGRAFT BONE AND INSTRUMENTATION, right approach, cervical 3-cervical 4;  Surgeon: Brett Patterson MD;  Location: [facility];  Service: Neurosurgery;  Laterality: Right;   • CORONARY ARTERY BYPASS GRAFT        General Information     Row Name 23 1301          OT Time and Intention    Document Type therapy note (daily note)  -LF     Mode of Treatment individual therapy;occupational therapy  -LF           User Key  (r) = Recorded By, (t) = Taken By, (c) = Cosigned By    Initials Name Provider Type    LF Rachael Garcia OT Occupational Therapist                 Mobility/ADL's     Row Name 23 1301          Bed Mobility    Bed Mobility rolling right;supine-sit  -LF     Rolling Right [place] (Bed Mobility) standby assist  -LF     Supine-Sit [place] (Bed Mobility) standby assist  -LF     Bed Mobility, Safety Issues decreased use of arms for pushing/pulling;decreased  use of legs for bridging/pushing  -     Assistive Device (Bed Mobility) bed rails;head of bed elevated  -     Comment, (Bed Mobility) Patient completed supine to sit via logroll technique.  -     Row Name 04/21/23 1301          Transfers    Transfers sit-stand transfer;stand-sit transfer  -     Comment, (Transfers) Pt completed standing pivot from EOB to recliner with min assist using RW. He required the recliner to be positioned to his left due to his right buckling with attempting to weightshift or advance LLE with functional mobility. Once seated in recliner sit to stand completed x3 reps with min assist using RW, requiring mod cues to push up from recliner armrests when ascending and to reach back for armrests when descending to ensure safe technique.  -     Row Name 04/21/23 1301          Sit-Stand Transfer    Sit-Stand [place] (Transfers) minimum assist (75% patient effort)  -LF     Assistive Device (Sit-Stand Transfers) walker, front-wheeled  -LF     Row Name 04/21/23 1301          Stand-Sit Transfer    Stand-Sit [place] (Transfers) minimum assist (75% patient effort)  -     Assistive Device (Stand-Sit Transfers) walker, front-wheeled  -LF     Row Name 04/21/23 1301          Activities of Daily Living    BADL Assessment/Intervention lower body dressing  -     Row Name 04/21/23 1301          Lower Body Dressing Assessment/Training    [place] Level (Lower Body Dressing) lower body dressing skills;doff;don;socks;maximum assist (25% patient effort)  -           User Key  (r) = Recorded By, (t) = Taken By, (c) = Cosigned By    Initials Name Provider Type    LF Rachael Garcia OT Occupational Therapist               Obj/Interventions     Row Name 04/21/23 1305          Motor Skills    Motor Skills functional endurance  -LF     Functional Endurance Fair-  -     Row Name 04/21/23 1305          Balance    Balance Assessment sitting dynamic balance;standing dynamic balance  -LF      Dynamic Sitting Balance standby assist  -LF     Position, Sitting Balance supported;sitting edge of bed  -LF     Dynamic Standing Balance minimal assist  -LF     Position/Device Used, Standing Balance supported;walker, front-wheeled  -LF     Balance Interventions sitting;standing;sit to stand;supported;dynamic;occupation based/functional task;weight shifting activity  -LF           User Key  (r) = Recorded By, (t) = Taken By, (c) = Cosigned By    Initials Name Provider Type    Rachael Saab OT Occupational Therapist               Goals/Plan    No documentation.                Clinical Impression     Row Name 04/21/23 1306          Pain Assessment    Additional Documentation Pain Scale: FACES Pre/Post-Treatment (Group)  -LF     Row Name 04/21/23 1306          Pain Scale: FACES Pre/Post-Treatment    Pain: FACES Scale, Pretreatment 2-->hurts little bit  -LF     Posttreatment Pain Rating 2-->hurts little bit  -LF     Row Name 04/21/23 1306          Plan of Care Review    Plan of Care Reviewed With patient  -     Progress improving  -     Outcome Evaluation Patient agreeable to OOB activity this session, as noted above. He would benefit from continued OT services to maximize independence.  -LF     Row Name 04/21/23 1306          Vital Signs    O2 Delivery Pre Treatment room air  -LF     O2 Delivery Intra Treatment room air  -LF     O2 Delivery Post Treatment room air  -LF           User Key  (r) = Recorded By, (t) = Taken By, (c) = Cosigned By    Initials Name Provider Type    Rachael Saab OT Occupational Therapist               Outcome Measures     Row Name 04/21/23 1307          How much help from another is currently needed...    Putting on and taking off regular lower body clothing? 2  -LF     Bathing (including washing, rinsing, and drying) 2  -LF     Toileting (which includes using toilet bed pan or urinal) 1  -LF     Putting on and taking off regular upper body clothing 3  -LF     Taking care of  personal grooming (such as brushing teeth) 3  -LF     Eating meals 4  -LF     AM-PAC 6 Clicks Score (OT) 15  -LF     Row Name 04/21/23 0833          How much help from another person do you currently need...    Turning from your back to your side while in flat bed without using bedrails? 4  -AR     Moving from lying on back to sitting on the side of a flat bed without bedrails? 3  -AR     Moving to and from a bed to a chair (including a wheelchair)? 2  -AR     Standing up from a chair using your arms (e.g., wheelchair, bedside chair)? 3  -AR     Climbing 3-5 steps with a railing? 1  -AR     To walk in hospital room? 2  -AR     AM-PAC 6 Clicks Score (PT) 15  -AR     Highest level of mobility 4 --> Transferred to chair/commode  -AR     Row Name 04/21/23 1307          Functional Assessment    Outcome Measure Options AM-PAC 6 Clicks Daily Activity (OT);Optimal Instrument  -LF     Row Name 04/21/23 1307          Optimal Instrument    Optimal Instrument Optimal - 3  -LF     Bending/Stooping 4  -LF     Standing 2  -LF     Reaching 2  -LF     From the list, choose the 3 activities you would most like to be able to do without any difficulty Bending/stooping;Standing;Reaching  -LF     Total Score Optimal - 3 8  -LF           User Key  (r) = Recorded By, (t) = Taken By, (c) = Cosigned By    Initials Name Provider Type    Umu Brenner, RN Registered Nurse    Rachael Saab OT Occupational Therapist                Occupational Therapy Education     Title: PT OT SLP Therapies (Resolved)     Topic: Occupational Therapy (Resolved)     Point: ADL training (Resolved)     Description:   Instruct learner(s) on proper safety adaptation and remediation techniques during self care or transfers.   Instruct in proper use of assistive devices.              Learning Progress Summary           Patient Acceptance, E,TB, VU by  at 4/20/2023 9555                   Point: Precautions (Resolved)     Description:   Instruct learner(s)  on prescribed precautions during self-care and functional transfers.              Learning Progress Summary           Patient Acceptance, E,TB, VU by  at 4/20/2023 1425                   Point: Body mechanics (Resolved)     Description:   Instruct learner(s) on proper positioning and spine alignment during self-care, functional mobility activities and/or exercises.              Learning Progress Summary           Patient Acceptance, E,TB, VU by  at 4/20/2023 1425                               User Key     Initials Effective Dates Name Provider Type Discipline     06/16/21 -  Rachael Garcia OT Occupational Therapist OT              OT Recommendation and Plan  Planned Therapy Interventions (OT): activity tolerance training, patient/caregiver education/training, BADL retraining, functional balance retraining, occupation/activity based interventions, strengthening exercise, transfer/mobility retraining  Therapy Frequency (OT): 5 times/wk  Plan of Care Review  Plan of Care Reviewed With: patient  Progress: improving  Outcome Evaluation: Patient agreeable to OOB activity this session, as noted above. He would benefit from continued OT services to maximize independence.     Time Calculation:    Time Calculation- OT     Row Name 04/21/23 1307             Time Calculation- OT    OT Received On 04/21/23  -LF      OT Goal Re-Cert Due Date 04/29/23  -LF         Timed Charges    52749 - OT Therapeutic Activity Minutes 12  -LF      49918 - OT Self Care/Mgmt Minutes 5  -LF         Total Minutes    Timed Charges Total Minutes 17  -LF       Total Minutes 17  -LF            User Key  (r) = Recorded By, (t) = Taken By, (c) = Cosigned By    Initials Name Provider Type     Rachael Garcia OT Occupational Therapist              Therapy Charges for Today     Code Description Service Date Service Provider Modifiers Qty    74832634923  OT EVAL LOW COMPLEXITY 3 4/20/2023 Rachael Garcia OT GO 1    [id number]  OT  THERAPEUTIC ACT EA 15 MIN 4/21/2023 Rachael Garcia, OT GO 1               Rachael Garcia, YANELIS  4/21/2023

## 2023-11-08 ENCOUNTER — OFFICE VISIT (OUTPATIENT)
Dept: VASCULAR SURGERY | Facility: HOSPITAL | Age: 73
End: 2023-11-08
Payer: MEDICARE

## 2023-11-08 VITALS
SYSTOLIC BLOOD PRESSURE: 138 MMHG | OXYGEN SATURATION: 92 % | DIASTOLIC BLOOD PRESSURE: 78 MMHG | TEMPERATURE: 97.1 F | RESPIRATION RATE: 16 BRPM | HEART RATE: 74 BPM

## 2023-11-08 DIAGNOSIS — I70.213 ATHEROSCLEROSIS OF NATIVE ARTERY OF BOTH LOWER EXTREMITIES WITH INTERMITTENT CLAUDICATION: Primary | ICD-10-CM

## 2023-11-08 DIAGNOSIS — I73.9 PVD (PERIPHERAL VASCULAR DISEASE): ICD-10-CM

## 2023-11-08 PROCEDURE — 1160F RVW MEDS BY RX/DR IN RCRD: CPT | Performed by: NURSE PRACTITIONER

## 2023-11-08 PROCEDURE — G0463 HOSPITAL OUTPT CLINIC VISIT: HCPCS | Performed by: NURSE PRACTITIONER

## 2023-11-08 PROCEDURE — 99203 OFFICE O/P NEW LOW 30 MIN: CPT | Performed by: NURSE PRACTITIONER

## 2023-11-08 PROCEDURE — 1159F MED LIST DOCD IN RCRD: CPT | Performed by: NURSE PRACTITIONER

## 2023-11-08 NOTE — PROGRESS NOTES
[facility] Vascular Surgery New Patient Office Note     Date of Encounter: 11/08/2023     MRN Number: [id number]  Name: Felix Cortez  Phone Number:      Referred By: Darryn Bardales MD  PCP: Alia Hogue APRN    Chief Complaint:    Chief Complaint   Patient presents with    Leg Pain    Leg Swelling     Patient is here as a new patient with leg pain and swelling. Patient states he has mild to moderate rest pain. Patient has had a recent UMAIR.        Subjective      History of Present Illness:    Felix Cortez is a 73 y.o. male presents as a referral from Dr. Darryn Bardales  For PVD. He states both his legs hurt all the time and are worse when he has to walk a significant distance or incline.  He is currently wearing a knee brace on the right leg.  He states the right leg hurts worse than the left. He had a recent lower extremity duplex that revealed monophasic waveforms and UMAIR's of 0.75 on the right and 0.72 on the left.  He does state his legs hurt at night but not to the point he has to get up or hang them off the bed for relief. He also has mild edema in both lower extremities, he explains the swelling comes and goes. He has had a stent placement in the left leg over 10+years ago and has an extensive cardiac history. He takes plavix and a statin medication daily.     Review of Systems:  ROS  Review of Systems   Constitutional: Negative.   HENT: Negative.    Cardiovascular: Negative.    Respiratory: Negative.    Skin: Negative.    Musculoskeletal: Negative.    Gastrointestinal: Negative.    Neurological: Negative.    Psychiatric/Behavioral: Negative.     I have reviewed the following portions of the patient's history: problem list, current medications, allergies, past surgical history, past medical history, past social history, past family history, and ROS and confirm it's accurate.    Allergies:  No Known Allergies    Medications:      Current Outpatient Medications:      acetaminophen (TYLENOL) 325 MG tablet, Take 2 tablets by mouth Every 6 (Six) Hours As Needed for Mild Pain., Disp: , Rfl:     atorvastatin (LIPITOR) 40 MG tablet, Take 1 tablet by mouth Every Night., Disp: , Rfl:     carvedilol (COREG) 6.25 MG tablet, Take 1 tablet by mouth 2 (Two) Times a Day With Meals. INST PER ANESTHESIA PROTOCOL, Disp: , Rfl:     clopidogrel (PLAVIX) 75 MG tablet, Take 1 tablet by mouth Daily. LAST DOSE 4/13/23, Disp: , Rfl:     Diclofenac Sodium (VOLTAREN) 1 % gel gel, Apply 4 g topically to the appropriate area as directed 4 (Four) Times a Day As Needed. LAST DOSE 4/14/23, Disp: , Rfl:     Entresto 24-26 MG tablet, Take 1 tablet by mouth 2 (Two) Times a Day. INST PER ANESTHESIA PROTOCOL, ARB, Disp: , Rfl:     escitalopram (LEXAPRO) 10 MG tablet, Take 1 tablet by mouth Every Morning., Disp: , Rfl:     folic acid (FOLVITE) 1 MG tablet, Take 1 tablet by mouth Daily. HOLD PREOP LAST DOSE 4/14/23, Disp: , Rfl:     furosemide (LASIX) 20 MG tablet, Take 1 tablet by mouth 2 (Two) Times a Day. INST PER ANESTHESIA PROTOCOL, Disp: , Rfl:     gabapentin (NEURONTIN) 100 MG capsule, Take 1 capsule by mouth Every Night., Disp: , Rfl:     glyburide (DIAbeta) 2.5 MG tablet, Take 1 tablet by mouth Daily With Breakfast. INST PER ANESTHESIA PROTOCOL, Disp: , Rfl:     ipratropium-albuterol (DUO-NEB) 0.5-2.5 mg/3 ml nebulizer, Take 3 mL by nebulization 4 (Four) Times a Day., Disp: , Rfl:     Lidocaine 4 % patch, Apply  topically. LAST DOSE 4/18/23, Disp: , Rfl:     melatonin 5 MG tablet tablet, Take 1 tablet by mouth At Night As Needed. LAST DOSE 4/14/23, Disp: , Rfl:     omeprazole (priLOSEC) 20 MG capsule, Take 1 capsule by mouth 2 (Two) Times a Day., Disp: , Rfl:     oxyCODONE-acetaminophen (PERCOCET) 5-325 MG per tablet, Take 1 tablet by mouth Every 4 (Four) Hours As Needed (Pain) for up to 25 doses., Disp: 25 tablet, Rfl: 0    oxyCODONE-acetaminophen (Percocet) 5-325 MG per tablet, Take 1 tablet by mouth  Every 4 (Four) Hours As Needed for Severe Pain., Disp: 25 tablet, Rfl: 0    vitamin D3 125 MCG (5000 UT) capsule capsule, Take 1 capsule by mouth Daily. LAST DOSE 4/14/23, Disp: , Rfl:     History:   Past Medical History:   Diagnosis Date    Coronary artery disease     BYPASS 2019? 3 V/FOLLOWS MALIK MADISON    DDD (degenerative disc disease), cervical     Deep vein thrombosis     Diabetes mellitus     ORAL MED    Hyperlipidemia     Hypertension     Smoker        Past Surgical History:   Procedure Laterality Date    ANTERIOR CERVICAL DISCECTOMY W/ FUSION Right 4/19/2023    Procedure: ANTERIOR CERVICAL DISCECTOMY AND FUSION USING ALLOGRAFT BONE AND INSTRUMENTATION, right approach, cervical 3-cervical 4;  Surgeon: Brett Patterson MD;  Location: [facility];  Service: Neurosurgery;  Laterality: Right;    CORONARY ARTERY BYPASS GRAFT         Social History     Socioeconomic History    Marital status:    Tobacco Use    Smoking status: Every Day     Packs/day: 1     Types: Cigarettes    Smokeless tobacco: Never   Vaping Use    Vaping Use: Never used   Substance and Sexual Activity    Alcohol use: Not Currently     Comment: 2-3 mixed drinks daily, none for 2-3 months    Sexual activity: Defer        Family History   Family history unknown: Yes       Objective     Physical Exam:  Vitals:    11/08/23 1318   BP: 138/78   BP Location: Right arm   Patient Position: Sitting   Cuff Size: Large Adult   Pulse: 74   Resp: 16   Temp: 97.1 °F (36.2 °C)   TempSrc: Temporal   SpO2: 92%   PainSc: 0-No pain      There is no height or weight on file to calculate BMI.    Physical Exam   Physical Exam  Constitutional:       Appearance: Normal appearance.   HENT:      Head: Normocephalic.   Cardiovascular:      Rate and Rhythm: Normal rate.      Pulses: Normal pulses.      Comments: Bilateral lower extremities: Nonpalpable pedal pulses, Doppler signal detected DP and PT.  Mild edema, discoloration in the toes, no open  areas or signs of infection.   Pulmonary:      Effort: Pulmonary effort is normal.   Musculoskeletal:         General: Normal range of motion.      Cervical back: Normal range of motion.   Skin:     General: Skin is warm and dry.      Capillary Refill: Capillary refill takes less than 2 seconds.      Comments:   Neurological:      General: No focal deficit present.      Mental Status: Alert and oriented to person, place, and time.   Psychiatric:         Mood and Affect: Mood normal.         Behavior: Behavior normal.  Imaging/Labs:  I have reviewed the results of the l bilateral ower extremity duplex performed at [facility] 9/18/2023. The duplex reveals multiphasic and monphasic waveforms, UMAIR's were 0.75 on the right and 0.72 on the left.   No radiology results for the last 30 days.       Assessment / Plan      Assessment / Plan:  Diagnoses and all orders for this visit:    1. Atherosclerosis of native artery of both lower extremities with intermittent claudication (Primary)  -     CT Angio Abdominal Aorta Bilateral Iliofem Runoff; Future    2. PVD (peripheral vascular disease)  -     CT Angio Abdominal Aorta Bilateral Iliofem Runoff; Future    Mr. Cortez is having bilateral lower extremity pain and claudication symptoms.  He recently had a bilateral lower extremity duplex performed at [facility] that revealed multiphasic waveforms throughout with ABIs of 0.75 on the right and 0.72 on the left.  We had a long discussion  concerning symptoms, testing and interventions and I have explained it appears he has a combination of symptoms, some of which are not vascular related.  I recommend that we obtain a CTA Ab/Pel with runoff and follow up with one of our surgeons.     He has requested that the CTA be performed at [facility]. We will schedule accordingly. I have answered all of their questions and they are in agreement with the plan at this time.  Thank you for allowing me to  participate in your patient's care.    Patient Education: Smoking cessation counseled      Follow Up:   No follow-ups on file.   Or sooner for any further concerns or worsening sign and symptoms. If unable to reach us in the office please dial 911 or go to the nearest emergency department.      CLEMENTE Harley  [facility] Vascular Surgery

## 2023-12-04 ENCOUNTER — OFFICE VISIT (OUTPATIENT)
Dept: VASCULAR SURGERY | Facility: HOSPITAL | Age: 73
End: 2023-12-04
Payer: MEDICARE

## 2023-12-04 VITALS
TEMPERATURE: 97.5 F | SYSTOLIC BLOOD PRESSURE: 148 MMHG | DIASTOLIC BLOOD PRESSURE: 82 MMHG | OXYGEN SATURATION: 92 % | HEART RATE: 74 BPM | RESPIRATION RATE: 18 BRPM

## 2023-12-04 DIAGNOSIS — I70.213 ATHEROSCLEROSIS OF NATIVE ARTERY OF BOTH LOWER EXTREMITIES WITH INTERMITTENT CLAUDICATION: Primary | ICD-10-CM

## 2023-12-04 PROCEDURE — 99214 OFFICE O/P EST MOD 30 MIN: CPT | Performed by: SURGERY

## 2023-12-04 PROCEDURE — 1160F RVW MEDS BY RX/DR IN RCRD: CPT | Performed by: SURGERY

## 2023-12-04 PROCEDURE — 1159F MED LIST DOCD IN RCRD: CPT | Performed by: SURGERY

## 2023-12-04 PROCEDURE — G0463 HOSPITAL OUTPT CLINIC VISIT: HCPCS | Performed by: SURGERY

## 2023-12-04 RX ORDER — CEFAZOLIN SODIUM 2 G/100ML
2000 INJECTION, SOLUTION INTRAVENOUS ONCE
OUTPATIENT
Start: 2023-12-04 | End: 2023-12-04

## 2023-12-04 NOTE — PROGRESS NOTES
[facility]   Follow up Office    Patient Name: Felix Cortez  : 1950  MRN: [id number]  Primary Care Physician:  Alia Hogue APRN      Subjective   Subjective     HPI:    Felix Cortez is a 73 y.o. male here for follow-up after a CTA.  He indicates that sometimes he can walk the length of the building, sometimes just a few steps.  The pain seems to be worse in the right side and at the calf level.      Objective     Vitals:   Temp:  [97.5 °F (36.4 °C)] 97.5 °F (36.4 °C)  Heart Rate:  [74] 74  Resp:  [18] 18  BP: (148)/(82) 148/82    Physical Exam      General: Alert, no acute distress.  HEENT: PERRLA  Abdomen: Benign  Extremities: Symmetric.  Neuro: No gross deficits    Diagnostic studies: A CTA dated 2023 has been reviewed.  Significant diffuse atherosclerotic disease with an area of focal stenosis in the right common iliac artery and bilateral common femoral and superficial femoral disease.    Assessment & Plan   Assessment / Plan     Diagnoses and all orders for this visit:    1. Atherosclerosis of native artery of both lower extremities with intermittent claudication (Primary)  -     Case Request; Standing  -     ceFAZolin in dextrose (ANCEF) IVPB solution 2,000 mg  -     Case Request    Other orders  -     Follow Anesthesia Guidelines / Protocol; Future  -     Follow Anesthesia Guidelines / Protocol; Standing  -     Verify NPO Status; Standing  -     Obtain Informed Consent; Standing  -     CBC & Differential; Standing  -     Basic Metabolic Panel; Standing  -     Insert Peripheral IV; Standing  -     Saline Lock & Maintain IV Access; Standing       Assessment/Plan:   Mr. Cortez has significant peripheral vascular disease associated with lifestyle limiting intermittent claudication.  Plan angiography with possible endovascular intervention.  I have discussed with the patient in detail the mechanics of the procedure, the indications, benefits, risks, alternatives as well  as potential complications to include but not limited to infection, bleeding, inability to cross the occlusion, vascular injury requiring surgical repair.  He appears to understand and desires to proceed.        Electronically signed by Taye New MD, 12/04/23, 1:12 PM EST.

## 2024-01-10 PROCEDURE — S0260 H&P FOR SURGERY: HCPCS | Performed by: SURGERY

## 2024-01-10 NOTE — H&P
[facility]   HISTORY AND PHYSICAL    Patient Name: Felix Cortez  : 1950  MRN: [id number]  Primary Care Physician:  Alia Hogue APRN  Date of admission: (Not on file)    Subjective   Subjective     Chief Complaint: Bilateral leg pain with ambulation, right worse than left    HPI:    Felix Cortez is a 73 y.o. male with bilateral leg pain with ambulation, right worse than left.    Review of Systems    Non contributory except for the History of Present Illness    Personal History     Past Medical History:   Diagnosis Date    Coronary artery disease     BYPASS 2019? 3 V/FOLLOWS MALIK SWENSON [place]    DDD (degenerative disc disease), cervical     Deep vein thrombosis     Diabetes mellitus     ORAL MED    Hyperlipidemia     Hypertension     Smoker        Past Surgical History:   Procedure Laterality Date    ANTERIOR CERVICAL DISCECTOMY W/ FUSION Right 2023    Procedure: ANTERIOR CERVICAL DISCECTOMY AND FUSION USING ALLOGRAFT BONE AND INSTRUMENTATION, right approach, cervical 3-cervical 4;  Surgeon: Brett Patterson MD;  Location: [facility];  Service: Neurosurgery;  Laterality: Right;    CORONARY ARTERY BYPASS GRAFT         Family History: Family history is unknown by patient. Otherwise pertinent FHx was reviewed and not pertinent to current issue.    Social History:  reports that he has been smoking cigarettes. He has been smoking an average of 1 pack per day. He has never used smokeless tobacco. He reports that he does not currently use alcohol.    Home Medications:  No current facility-administered medications on file prior to encounter.     Current Outpatient Medications on File Prior to Encounter   Medication Sig    acetaminophen (TYLENOL) 325 MG tablet Take 2 tablets by mouth Every 6 (Six) Hours As Needed for Mild Pain.    atorvastatin (LIPITOR) 40 MG tablet Take 1 tablet by mouth Every Night.    carvedilol (COREG) 6.25 MG tablet Take 1 tablet by mouth 2 (Two) Times a  Day With Meals. INST PER ANESTHESIA PROTOCOL    clopidogrel (PLAVIX) 75 MG tablet Take 1 tablet by mouth Daily. LAST DOSE 4/13/23    Diclofenac Sodium (VOLTAREN) 1 % gel gel Apply 4 g topically to the appropriate area as directed 4 (Four) Times a Day As Needed. LAST DOSE 4/14/23    Entresto 24-26 MG tablet Take 1 tablet by mouth 2 (Two) Times a Day. INST PER ANESTHESIA PROTOCOL, ARB    escitalopram (LEXAPRO) 10 MG tablet Take 1 tablet by mouth Every Morning.    folic acid (FOLVITE) 1 MG tablet Take 1 tablet by mouth Daily. HOLD PREOP LAST DOSE 4/14/23    furosemide (LASIX) 20 MG tablet Take 1 tablet by mouth 2 (Two) Times a Day. INST PER ANESTHESIA PROTOCOL    gabapentin (NEURONTIN) 100 MG capsule Take 1 capsule by mouth Every Night.    glyburide (DIAbeta) 2.5 MG tablet Take 1 tablet by mouth Daily With Breakfast. INST PER ANESTHESIA PROTOCOL    ipratropium-albuterol (DUO-NEB) 0.5-2.5 mg/3 ml nebulizer Take 3 mL by nebulization 4 (Four) Times a Day.    Lidocaine 4 % patch Apply  topically. LAST DOSE 4/18/23    melatonin 5 MG tablet tablet Take 1 tablet by mouth At Night As Needed. LAST DOSE 4/14/23    omeprazole (priLOSEC) 20 MG capsule Take 1 capsule by mouth 2 (Two) Times a Day.    oxyCODONE-acetaminophen (PERCOCET) 5-325 MG per tablet Take 1 tablet by mouth Every 4 (Four) Hours As Needed (Pain) for up to 25 doses.    oxyCODONE-acetaminophen (Percocet) 5-325 MG per tablet Take 1 tablet by mouth Every 4 (Four) Hours As Needed for Severe Pain.    vitamin D3 125 MCG (5000 UT) capsule capsule Take 1 capsule by mouth Daily. LAST DOSE 4/14/23          Allergies:  No Known Allergies    Objective   Objective     Vitals:        Physical Exam    General: Awake, alert, NAD   Eyes:  YOANNA   Neck: Supple   Lungs: Clear   Heart: RRR   Abdomen: benign   Musculoskeletal:  normal motor tone, symmetric   Skin: warm, normal turgor   Neuro: strength 5/5 all extremities     Diagnostic studies:   A CTA dated 11/29/2023 has been  reviewed.  Significant diffuse atherosclerotic disease with an area of focal stenosis in the right common iliac artery and bilateral common femoral and superficial femoral disease.     Assessment & Plan   Assessment / Plan     Active Hospital Problems:  Active Hospital Problems    Diagnosis     **Atherosclerosis of native artery of both lower extremities with intermittent claudication        Diagnoses and all orders for this visit:    1. Atherosclerosis of native artery of both lower extremities with intermittent claudication  -     Cardiac Catheterization/Vascular Study; Standing  -     Cardiac Catheterization/Vascular Study        Assessment/plan:   Mr. Cortez has significant peripheral vascular disease associated with lifestyle limiting intermittent claudication.  Plan angiography with possible endovascular intervention.  I have discussed with the patient in detail the mechanics of the procedure, the indications, benefits, risks, alternatives as well as potential complications to include but not limited to infection, bleeding, inability to cross the occlusion, vascular injury requiring surgical repair.  He appears to understand and desires to proceed.         Electronically signed by Taye New MD, 01/10/24, 4:32 PM EST.

## 2024-01-11 ENCOUNTER — HOSPITAL ENCOUNTER (OUTPATIENT)
Facility: HOSPITAL | Age: 74
Setting detail: HOSPITAL OUTPATIENT SURGERY
Discharge: HOME | End: 2024-01-11
Attending: SURGERY | Admitting: SURGERY
Payer: MEDICARE

## 2024-01-11 VITALS
BODY MASS INDEX: 28.91 KG/M2 | HEART RATE: 70 BPM | DIASTOLIC BLOOD PRESSURE: 50 MMHG | HEIGHT: 65 IN | OXYGEN SATURATION: 94 % | TEMPERATURE: 97 F | RESPIRATION RATE: 16 BRPM | SYSTOLIC BLOOD PRESSURE: 114 MMHG | WEIGHT: 173.5 LBS

## 2024-01-11 DIAGNOSIS — I70.213 ATHEROSCLEROSIS OF NATIVE ARTERY OF BOTH LOWER EXTREMITIES WITH INTERMITTENT CLAUDICATION: ICD-10-CM

## 2024-01-11 LAB
ANION GAP SERPL CALCULATED.3IONS-SCNC: 11.5 MMOL/L (ref 5–15)
BASOPHILS # BLD AUTO: 0.05 10*3/MM3 (ref 0–0.2)
BASOPHILS NFR BLD AUTO: 0.5 % (ref 0–1.5)
BUN SERPL-MCNC: 7 MG/DL (ref 8–23)
BUN/CREAT SERPL: 7.3 (ref 7–25)
CALCIUM SPEC-SCNC: 9.3 MG/DL (ref 8.6–10.5)
CHLORIDE SERPL-SCNC: 94 MMOL/L (ref 98–107)
CO2 SERPL-SCNC: 30.5 MMOL/L (ref 22–29)
CREAT SERPL-MCNC: 0.96 MG/DL (ref 0.76–1.27)
DEPRECATED RDW RBC AUTO: 44.8 FL (ref 37–54)
EGFRCR SERPLBLD CKD-EPI 2021: 83.5 ML/MIN/1.73
EOSINOPHIL # BLD AUTO: 0.08 10*3/MM3 (ref 0–0.4)
EOSINOPHIL NFR BLD AUTO: 0.7 % (ref 0.3–6.2)
ERYTHROCYTE [DISTWIDTH] IN BLOOD BY AUTOMATED COUNT: 13.4 % (ref 12.3–15.4)
GLUCOSE SERPL-MCNC: 109 MG/DL (ref 65–99)
HCT VFR BLD AUTO: 34 % (ref 37.5–51)
HGB BLD-MCNC: 11.1 G/DL (ref 13–17.7)
IMM GRANULOCYTES # BLD AUTO: 0.04 10*3/MM3 (ref 0–0.05)
IMM GRANULOCYTES NFR BLD AUTO: 0.4 % (ref 0–0.5)
LYMPHOCYTES # BLD AUTO: 2.13 10*3/MM3 (ref 0.7–3.1)
LYMPHOCYTES NFR BLD AUTO: 19.4 % (ref 19.6–45.3)
MCH RBC QN AUTO: 30 PG (ref 26.6–33)
MCHC RBC AUTO-ENTMCNC: 32.6 G/DL (ref 31.5–35.7)
MCV RBC AUTO: 91.9 FL (ref 79–97)
MONOCYTES # BLD AUTO: 0.57 10*3/MM3 (ref 0.1–0.9)
MONOCYTES NFR BLD AUTO: 5.2 % (ref 5–12)
NEUTROPHILS NFR BLD AUTO: 73.8 % (ref 42.7–76)
NEUTROPHILS NFR BLD AUTO: 8.13 10*3/MM3 (ref 1.7–7)
NRBC BLD AUTO-RTO: 0 /100 WBC (ref 0–0.2)
PLATELET # BLD AUTO: 278 10*3/MM3 (ref 140–450)
PMV BLD AUTO: 9 FL (ref 6–12)
POTASSIUM SERPL-SCNC: 3.1 MMOL/L (ref 3.5–5.2)
RBC # BLD AUTO: 3.7 10*6/MM3 (ref 4.14–5.8)
SODIUM SERPL-SCNC: 136 MMOL/L (ref 136–145)
WBC NRBC COR # BLD AUTO: 11 10*3/MM3 (ref 3.4–10.8)

## 2024-01-11 PROCEDURE — 99153 MOD SED SAME PHYS/QHP EA: CPT | Performed by: SURGERY

## 2024-01-11 PROCEDURE — C1887 CATHETER, GUIDING: HCPCS | Performed by: SURGERY

## 2024-01-11 PROCEDURE — C1894 INTRO/SHEATH, NON-LASER: HCPCS | Performed by: SURGERY

## 2024-01-11 PROCEDURE — 85025 COMPLETE CBC W/AUTO DIFF WBC: CPT | Performed by: SURGERY

## 2024-01-11 PROCEDURE — 37220 PR REVASCULARIZATION ILIAC ARTERY ANGIOP 1ST VSL: CPT | Performed by: SURGERY

## 2024-01-11 PROCEDURE — 99152 MOD SED SAME PHYS/QHP 5/>YRS: CPT | Performed by: SURGERY

## 2024-01-11 PROCEDURE — 37224 PR REVSC OPN/PRG FEM/POP W/ANGIOPLASTY UNI: CPT | Performed by: SURGERY

## 2024-01-11 PROCEDURE — 25010000002 PROTAMINE SULFATE PER 10 MG: Performed by: SURGERY

## 2024-01-11 PROCEDURE — 75625 CONTRAST EXAM ABDOMINL AORTA: CPT | Performed by: SURGERY

## 2024-01-11 PROCEDURE — 25010000002 HEPARIN (PORCINE) PER 1000 UNITS: Performed by: SURGERY

## 2024-01-11 PROCEDURE — 25810000003 SODIUM CHLORIDE 0.9 % SOLUTION: Performed by: SURGERY

## 2024-01-11 PROCEDURE — 0 IODIXANOL PER 1 ML: Performed by: SURGERY

## 2024-01-11 PROCEDURE — 75710 ARTERY X-RAYS ARM/LEG: CPT | Performed by: SURGERY

## 2024-01-11 PROCEDURE — 80048 BASIC METABOLIC PNL TOTAL CA: CPT | Performed by: SURGERY

## 2024-01-11 PROCEDURE — C1769 GUIDE WIRE: HCPCS | Performed by: SURGERY

## 2024-01-11 PROCEDURE — C1725 CATH, TRANSLUMIN NON-LASER: HCPCS | Performed by: SURGERY

## 2024-01-11 PROCEDURE — 25010000002 FENTANYL CITRATE (PF) 50 MCG/ML SOLUTION: Performed by: SURGERY

## 2024-01-11 PROCEDURE — 25010000002 MIDAZOLAM PER 1MG: Performed by: SURGERY

## 2024-01-11 RX ORDER — LIDOCAINE HYDROCHLORIDE 20 MG/ML
INJECTION, SOLUTION INFILTRATION; PERINEURAL
Status: DISCONTINUED | OUTPATIENT
Start: 2024-01-11 | End: 2024-01-11 | Stop reason: HOSPADM

## 2024-01-11 RX ORDER — PROTAMINE SULFATE 10 MG/ML
INJECTION, SOLUTION INTRAVENOUS
Status: DISCONTINUED | OUTPATIENT
Start: 2024-01-11 | End: 2024-01-11 | Stop reason: HOSPADM

## 2024-01-11 RX ORDER — IODIXANOL 320 MG/ML
INJECTION, SOLUTION INTRAVASCULAR
Status: DISCONTINUED | OUTPATIENT
Start: 2024-01-11 | End: 2024-01-11 | Stop reason: HOSPADM

## 2024-01-11 RX ORDER — HEPARIN SODIUM 1000 [USP'U]/ML
INJECTION, SOLUTION INTRAVENOUS; SUBCUTANEOUS
Status: DISCONTINUED | OUTPATIENT
Start: 2024-01-11 | End: 2024-01-11 | Stop reason: HOSPADM

## 2024-01-11 RX ORDER — FENTANYL CITRATE 50 UG/ML
INJECTION, SOLUTION INTRAMUSCULAR; INTRAVENOUS
Status: DISCONTINUED | OUTPATIENT
Start: 2024-01-11 | End: 2024-01-11 | Stop reason: HOSPADM

## 2024-01-11 RX ORDER — CEFAZOLIN SODIUM 2 G/100ML
2000 INJECTION, SOLUTION INTRAVENOUS ONCE
Status: DISCONTINUED | OUTPATIENT
Start: 2024-01-11 | End: 2024-01-11 | Stop reason: HOSPADM

## 2024-01-11 RX ORDER — MIDAZOLAM HYDROCHLORIDE 2 MG/2ML
INJECTION, SOLUTION INTRAMUSCULAR; INTRAVENOUS
Status: DISCONTINUED | OUTPATIENT
Start: 2024-01-11 | End: 2024-01-11 | Stop reason: HOSPADM

## 2024-01-11 RX ORDER — SODIUM CHLORIDE 9 MG/ML
75 INJECTION, SOLUTION INTRAVENOUS CONTINUOUS
Status: DISCONTINUED | OUTPATIENT
Start: 2024-01-11 | End: 2024-01-11 | Stop reason: HOSPADM

## 2024-01-11 NOTE — Clinical Note
The DP pulses are non-palpable bilaterally. The left PT pulse is non-palpable. The right PT pulse is detected w/ doppler.

## 2024-01-11 NOTE — Clinical Note
using micropuncture technique with ultrasound guidance into the left femoral artery. Sheath insertion not delayed.

## 2024-01-11 NOTE — PROGRESS NOTES
Angiogram performed.  Severe focal stenosis near the origin of the right common iliac artery, angioplastied with excellent results and no significant residual stenosis.  Moderate to severe multifocal right superficial femoral artery disease, angioplastied with excellent results and no significant residual stenosis.  For details find full report under chart review, cardiology tab.

## 2024-01-11 NOTE — DISCHARGE INSTRUCTIONS
DISCHARGE INSTRUCTIONS  VASCULAR  PROCEDURES      For your surgery you had:  IV sedation.     You may experience dizziness, drowsiness, or light-headedness for several hours following surgery/procedure.  Do not stay alone today or tonight.  Limit your activity for 24 hours.  Resume your diet slowly.  Follow whatever special dietary instructions you may have been given by your doctor.  You should not drive or operate machinery, drink alcohol, or sign legally binding documents for 24 hours or while you are taking pain medication.    NOTIFY YOUR DOCTOR IF YOU EXPERIENCE ANY OF THE FOLLOWING:  Temperature greater than 101 degrees Fahrenheit  Shaking Chills  Redness or excessive drainage from incision  Nausea, vomiting and/or pain that is not controlled by prescribed medications  Increase in bleeding or bleeding that is excessive  Unable to urinate in 6 hours after surgery  If unable to reach your doctor, please go to the closest Emergency Room  May remove dressing: __  No heavy lifting greater than __ for __.  You may shower __, no submerging of incision for 2 weeks. (Pat site dry only)  Apply an ice pack 24-48 hours.    Medications per physician instructions as indicated on After Visit Summary.        [] [facility] VASCULAR CLOSURE DEVICE DISCHARGE INSTRUCTIONS:  Reapply new band-aid daily or more frequently for 5 days or until scab has formed on site.  Keep site clean and dry.  Avoid tight fitting clothing/ underwear until site healed.  NO driving for 24 hours, avoid driving unless necessary and go slowly.  NO strenuous activity, exercise, pushing or pulling.  No heavy lifting greater than 5 lbs.  Avoid stairs, if necessary go slowly.  While coughing, sneezing, or straining for bowel movement, apply pressure with palm of hand to site.  Md to release for work or sexual activity.      SPECIAL INSTRUCTIONS:    May remove dressing in 1 day and wash area.  Follow-up in the office in 2 weeks, call for appointment.  Resume  home diet.  Resume home medications.  No lifting greater than 15 pounds for 3 days.

## 2024-01-31 ENCOUNTER — OFFICE VISIT (OUTPATIENT)
Dept: VASCULAR SURGERY | Facility: HOSPITAL | Age: 74
End: 2024-01-31
Payer: MEDICARE

## 2024-01-31 VITALS
HEART RATE: 72 BPM | TEMPERATURE: 96.7 F | SYSTOLIC BLOOD PRESSURE: 145 MMHG | DIASTOLIC BLOOD PRESSURE: 80 MMHG | OXYGEN SATURATION: 93 % | RESPIRATION RATE: 16 BRPM

## 2024-01-31 DIAGNOSIS — I73.9 PVD (PERIPHERAL VASCULAR DISEASE): ICD-10-CM

## 2024-01-31 DIAGNOSIS — I70.213 ATHEROSCLEROSIS OF NATIVE ARTERY OF BOTH LOWER EXTREMITIES WITH INTERMITTENT CLAUDICATION: Primary | ICD-10-CM

## 2024-01-31 PROCEDURE — G0463 HOSPITAL OUTPT CLINIC VISIT: HCPCS | Performed by: NURSE PRACTITIONER

## 2024-01-31 NOTE — PROGRESS NOTES
[facility]     Progress Note    Patient Name: Felix Cortez  : 1950  MRN: [id number]  Primary Care Physician:  Alia Hogue, CLEMENTE  Date of admission: (Not on file)  Chief Complaint:    Chief Complaint   Patient presents with    Follow-up     Post angio       Subjective   Subjective     Mr. Cortez presents for follow-up, he had an angiogram with angioplasty of the right common iliac artery and superficial femoral artery performed Dr. New on 2024 for severe stenosis.  His right leg is doing much better denies any pain or claudication however states his left leg feels worse.  He voices a desire to have an intervention on the left leg ASAP.  He currently takes Plavix prescribed by his cardiologist.    Objective   Objective     Vitals:   Temp:  [96.7 °F (35.9 °C)] 96.7 °F (35.9 °C)  Heart Rate:  [72] 72  Resp:  [16] 16  BP: (145)/(80) 145/80    Physical Exam   General: Alert, no acute distress  Extremities: Symmetrical  Neuro: No gross deaf    Result Review    Result Review:  I have personally reviewed the results from the time of this admission to 2024 13:51 EST and agree with these findings:  []  Laboratory  []  Microbiology  []  Radiology  []  EKG/Telemetry   []  Cardiology/Vascular   []  Pathology  []  Old records  []  Other:    Most notable findings include:     Assessment & Plan   Assessment / Plan     Assessment/Plan:  Diagnoses and all orders for this visit:    1. Atherosclerosis of native artery of both lower extremities with intermittent claudication (Primary)  -     Doppler Arterial Multi Level Lower Extremity - Bilateral CAR; Future    2. PVD (peripheral vascular disease)  -     Doppler Arterial Multi Level Lower Extremity - Bilateral CAR; Future    Mr. Cortez is doing well following a right leg endovascular intervention performed Dr. New on 2023 for lifestyle limiting intermittent claudication.  He is very eager to seek treatment on the left leg because his  symptoms have seemed to progress. I recommend a follow-up in 3 to 4 weeks with Dr. New and a arterial Doppler.      I have answered all of his questions and he is in agreement with the plan at this time.  Thank you for allowing me to participate in your patient's care.    Active Hospital Problems:  There are no active hospital problems to display for this patient.          Electronically signed by CLEMENTE Harley, 01/31/24, 1:32 PM EST.

## 2024-04-05 ENCOUNTER — TELEPHONE (OUTPATIENT)
Dept: VASCULAR SURGERY | Facility: HOSPITAL | Age: 74
End: 2024-04-05

## (undated) DEVICE — CATH OMNI FLUSH 5FR

## (undated) DEVICE — INTRO SHEATH PRELUDE/PRO .035 5F 11X50CM GRY LF

## (undated) DEVICE — SUT MNCRYL PLS ANTIB UD 4/0 PS2 18IN

## (undated) DEVICE — KT INTRO MIC VSI SMOTH STFF 4F 40CM 7CM

## (undated) DEVICE — SUT VIC 2/0 CT1 36IN

## (undated) DEVICE — RADIFOCUS GLIDEWIRE: Brand: GLIDEWIRE

## (undated) DEVICE — GW STARTER JB STR .035 15X180CM

## (undated) DEVICE — RETRACTION ASPIRATOR: Brand: FLOWTRIEVER

## (undated) DEVICE — DISTRACT SCRW 14MM STRL

## (undated) DEVICE — LAMINECTOMY CERVICAL DISC-LF: Brand: MEDLINE INDUSTRIES, INC.

## (undated) DEVICE — STERILE POLYISOPRENE POWDER-FREE SURGICAL GLOVES: Brand: PROTEXIS

## (undated) DEVICE — DESTINATION RENAL GUIDING SHEATH: Brand: DESTINATION

## (undated) DEVICE — STERILE POLYISOPRENE POWDER-FREE SURGICAL GLOVES WITH EMOLLIENT COATING: Brand: PROTEXIS

## (undated) DEVICE — DRP MICROSCP LECIA W/CLEARLENS 137X381CM

## (undated) DEVICE — GAMMEX® NON-LATEX SIZE 7.5, STERILE NEOPRENE POWDER-FREE SURGICAL GLOVE: Brand: GAMMEX

## (undated) DEVICE — BALN EVERCROSS OTW .035 5F 7X40 80

## (undated) DEVICE — GLV SURG BIOGEL LTX PF 7 1/2

## (undated) DEVICE — PENCL E/S HNDSWCH ROCKR CB

## (undated) DEVICE — SLV SCD KN/LEN ADJ EXPRSS BLENDED MD 1P/U

## (undated) DEVICE — RADIFOCUS GLIDECATH: Brand: GLIDECATH

## (undated) DEVICE — SABER .035 PTA DILATION CATHETER 5MM X 300MM X 80CM: Brand: SABER .035